# Patient Record
Sex: MALE | Race: WHITE | NOT HISPANIC OR LATINO | Employment: FULL TIME | URBAN - METROPOLITAN AREA
[De-identification: names, ages, dates, MRNs, and addresses within clinical notes are randomized per-mention and may not be internally consistent; named-entity substitution may affect disease eponyms.]

---

## 2017-01-02 ENCOUNTER — APPOINTMENT (EMERGENCY)
Dept: RADIOLOGY | Facility: HOSPITAL | Age: 52
DRG: 227 | End: 2017-01-02
Payer: COMMERCIAL

## 2017-01-02 ENCOUNTER — HOSPITAL ENCOUNTER (INPATIENT)
Facility: HOSPITAL | Age: 52
LOS: 1 days | Discharge: HOME | DRG: 227 | End: 2017-01-04
Attending: EMERGENCY MEDICINE | Admitting: ORTHOPAEDIC SURGERY
Payer: COMMERCIAL

## 2017-01-02 DIAGNOSIS — S42.401A ELBOW FRACTURE, RIGHT: Primary | ICD-10-CM

## 2017-01-02 DIAGNOSIS — T14.8XXA OPEN FRACTURE: ICD-10-CM

## 2017-01-02 LAB
ABO GROUP BLD: NORMAL
ANION GAP SERPL CALCULATED.3IONS-SCNC: 12 MMOL/L (ref 4–13)
APTT PPP: 25 SECONDS (ref 24–36)
BASOPHILS # BLD AUTO: 0 THOUSANDS/ΜL (ref 0–0.1)
BASOPHILS NFR BLD AUTO: 0 % (ref 0–1)
BLD GP AB SCN SERPL QL: NEGATIVE
BUN SERPL-MCNC: 15 MG/DL (ref 5–25)
CALCIUM SERPL-MCNC: 8.9 MG/DL (ref 8.3–10.1)
CHLORIDE SERPL-SCNC: 103 MMOL/L (ref 100–108)
CO2 SERPL-SCNC: 24 MMOL/L (ref 21–32)
CREAT SERPL-MCNC: 1.13 MG/DL (ref 0.6–1.3)
EOSINOPHIL # BLD AUTO: 0.2 THOUSAND/ΜL (ref 0–0.61)
EOSINOPHIL NFR BLD AUTO: 1 % (ref 0–6)
ERYTHROCYTE [DISTWIDTH] IN BLOOD BY AUTOMATED COUNT: 14.5 % (ref 11.6–15.1)
GFR SERPL CREATININE-BSD FRML MDRD: >60 ML/MIN/1.73SQ M
GLUCOSE SERPL-MCNC: 109 MG/DL (ref 65–140)
HCT VFR BLD AUTO: 46.5 % (ref 42–52)
HGB BLD-MCNC: 15.3 G/DL (ref 14–18)
LYMPHOCYTES # BLD AUTO: 3.6 THOUSANDS/ΜL (ref 0.6–4.47)
LYMPHOCYTES NFR BLD AUTO: 32 % (ref 14–44)
MCH RBC QN AUTO: 28.5 PG (ref 27–31)
MCHC RBC AUTO-ENTMCNC: 33 G/DL (ref 31.4–37.4)
MCV RBC AUTO: 86 FL (ref 82–98)
MONOCYTES # BLD AUTO: 1 THOUSAND/ΜL (ref 0.17–1.22)
MONOCYTES NFR BLD AUTO: 9 % (ref 4–12)
NEUTROPHILS # BLD AUTO: 6.4 THOUSANDS/ΜL (ref 1.85–7.62)
NEUTS SEG NFR BLD AUTO: 57 % (ref 43–75)
NRBC BLD AUTO-RTO: 0 /100 WBCS
PLATELET # BLD AUTO: 271 THOUSANDS/UL (ref 130–400)
PMV BLD AUTO: 7.9 FL (ref 8.9–12.7)
POTASSIUM SERPL-SCNC: 4.1 MMOL/L (ref 3.5–5.3)
RBC # BLD AUTO: 5.38 MILLION/UL (ref 4.7–6.1)
RH BLD: NEGATIVE
SODIUM SERPL-SCNC: 139 MMOL/L (ref 136–145)
WBC # BLD AUTO: 11.2 THOUSAND/UL (ref 4.8–10.8)

## 2017-01-02 PROCEDURE — 85730 THROMBOPLASTIN TIME PARTIAL: CPT | Performed by: EMERGENCY MEDICINE

## 2017-01-02 PROCEDURE — 96365 THER/PROPH/DIAG IV INF INIT: CPT

## 2017-01-02 PROCEDURE — 73080 X-RAY EXAM OF ELBOW: CPT

## 2017-01-02 PROCEDURE — 86901 BLOOD TYPING SEROLOGIC RH(D): CPT | Performed by: EMERGENCY MEDICINE

## 2017-01-02 PROCEDURE — 73030 X-RAY EXAM OF SHOULDER: CPT

## 2017-01-02 PROCEDURE — 85025 COMPLETE CBC W/AUTO DIFF WBC: CPT | Performed by: EMERGENCY MEDICINE

## 2017-01-02 PROCEDURE — 73060 X-RAY EXAM OF HUMERUS: CPT

## 2017-01-02 PROCEDURE — 96376 TX/PRO/DX INJ SAME DRUG ADON: CPT

## 2017-01-02 PROCEDURE — 96375 TX/PRO/DX INJ NEW DRUG ADDON: CPT

## 2017-01-02 PROCEDURE — 86850 RBC ANTIBODY SCREEN: CPT | Performed by: EMERGENCY MEDICINE

## 2017-01-02 PROCEDURE — 73090 X-RAY EXAM OF FOREARM: CPT

## 2017-01-02 PROCEDURE — 80048 BASIC METABOLIC PNL TOTAL CA: CPT | Performed by: EMERGENCY MEDICINE

## 2017-01-02 PROCEDURE — 86900 BLOOD TYPING SEROLOGIC ABO: CPT | Performed by: EMERGENCY MEDICINE

## 2017-01-02 PROCEDURE — 36415 COLL VENOUS BLD VENIPUNCTURE: CPT | Performed by: EMERGENCY MEDICINE

## 2017-01-02 RX ORDER — MORPHINE SULFATE 4 MG/ML
4 INJECTION, SOLUTION INTRAMUSCULAR; INTRAVENOUS ONCE
Status: COMPLETED | OUTPATIENT
Start: 2017-01-02 | End: 2017-01-02

## 2017-01-02 RX ORDER — MORPHINE SULFATE 4 MG/ML
INJECTION, SOLUTION INTRAMUSCULAR; INTRAVENOUS
Status: COMPLETED
Start: 2017-01-02 | End: 2017-01-02

## 2017-01-02 RX ADMIN — MORPHINE SULFATE 4 MG: 4 INJECTION, SOLUTION INTRAMUSCULAR; INTRAVENOUS at 20:28

## 2017-01-02 RX ADMIN — MORPHINE SULFATE 4 MG: 4 INJECTION, SOLUTION INTRAMUSCULAR; INTRAVENOUS at 23:09

## 2017-01-02 RX ADMIN — CEFAZOLIN SODIUM 2000 MG: 2 SOLUTION INTRAVENOUS at 23:12

## 2017-01-03 ENCOUNTER — ANESTHESIA EVENT (INPATIENT)
Dept: PERIOP | Facility: HOSPITAL | Age: 52
DRG: 227 | End: 2017-01-03
Payer: COMMERCIAL

## 2017-01-03 ENCOUNTER — ANESTHESIA (INPATIENT)
Dept: PERIOP | Facility: HOSPITAL | Age: 52
DRG: 227 | End: 2017-01-03
Payer: COMMERCIAL

## 2017-01-03 PROBLEM — S42.409B: Status: ACTIVE | Noted: 2017-01-03

## 2017-01-03 PROCEDURE — 0LQ50ZZ REPAIR RIGHT LOWER ARM AND WRIST TENDON, OPEN APPROACH: ICD-10-PCS | Performed by: ORTHOPAEDIC SURGERY

## 2017-01-03 PROCEDURE — 87081 CULTURE SCREEN ONLY: CPT | Performed by: ORTHOPAEDIC SURGERY

## 2017-01-03 PROCEDURE — 99285 EMERGENCY DEPT VISIT HI MDM: CPT

## 2017-01-03 PROCEDURE — 0JDG0ZZ EXTRACTION OF RIGHT LOWER ARM SUBCUTANEOUS TISSUE AND FASCIA, OPEN APPROACH: ICD-10-PCS | Performed by: ORTHOPAEDIC SURGERY

## 2017-01-03 RX ORDER — ONDANSETRON 2 MG/ML
INJECTION INTRAMUSCULAR; INTRAVENOUS AS NEEDED
Status: DISCONTINUED | OUTPATIENT
Start: 2017-01-03 | End: 2017-01-04 | Stop reason: SURG

## 2017-01-03 RX ORDER — PROPOFOL 10 MG/ML
INJECTION, EMULSION INTRAVENOUS AS NEEDED
Status: DISCONTINUED | OUTPATIENT
Start: 2017-01-03 | End: 2017-01-04 | Stop reason: SURG

## 2017-01-03 RX ORDER — KETOROLAC TROMETHAMINE 30 MG/ML
INJECTION, SOLUTION INTRAMUSCULAR; INTRAVENOUS AS NEEDED
Status: DISCONTINUED | OUTPATIENT
Start: 2017-01-03 | End: 2017-01-04 | Stop reason: SURG

## 2017-01-03 RX ORDER — DIPHENHYDRAMINE HCL 25 MG
25 TABLET ORAL EVERY 6 HOURS PRN
Status: DISCONTINUED | OUTPATIENT
Start: 2017-01-03 | End: 2017-01-04 | Stop reason: HOSPADM

## 2017-01-03 RX ORDER — MAGNESIUM HYDROXIDE 1200 MG/15ML
LIQUID ORAL AS NEEDED
Status: DISCONTINUED | OUTPATIENT
Start: 2017-01-03 | End: 2017-01-03 | Stop reason: HOSPADM

## 2017-01-03 RX ORDER — HYDROMORPHONE HCL 110MG/55ML
0.5 PATIENT CONTROLLED ANALGESIA SYRINGE INTRAVENOUS
Status: DISCONTINUED | OUTPATIENT
Start: 2017-01-03 | End: 2017-01-03 | Stop reason: HOSPADM

## 2017-01-03 RX ORDER — MORPHINE SULFATE 2 MG/ML
2 INJECTION, SOLUTION INTRAMUSCULAR; INTRAVENOUS EVERY 4 HOURS PRN
Status: DISCONTINUED | OUTPATIENT
Start: 2017-01-03 | End: 2017-01-04 | Stop reason: HOSPADM

## 2017-01-03 RX ORDER — OXYCODONE HYDROCHLORIDE AND ACETAMINOPHEN 5; 325 MG/1; MG/1
1 TABLET ORAL EVERY 4 HOURS PRN
Status: DISCONTINUED | OUTPATIENT
Start: 2017-01-03 | End: 2017-01-04 | Stop reason: HOSPADM

## 2017-01-03 RX ORDER — MIDAZOLAM HYDROCHLORIDE 1 MG/ML
INJECTION INTRAMUSCULAR; INTRAVENOUS AS NEEDED
Status: DISCONTINUED | OUTPATIENT
Start: 2017-01-03 | End: 2017-01-04 | Stop reason: SURG

## 2017-01-03 RX ORDER — ACETAMINOPHEN 325 MG/1
650 TABLET ORAL EVERY 6 HOURS PRN
Status: DISCONTINUED | OUTPATIENT
Start: 2017-01-03 | End: 2017-01-04 | Stop reason: HOSPADM

## 2017-01-03 RX ORDER — BUPIVACAINE HYDROCHLORIDE AND EPINEPHRINE 5; 5 MG/ML; UG/ML
INJECTION, SOLUTION PERINEURAL AS NEEDED
Status: DISCONTINUED | OUTPATIENT
Start: 2017-01-03 | End: 2017-01-03 | Stop reason: HOSPADM

## 2017-01-03 RX ORDER — ONDANSETRON 4 MG/1
4 TABLET, ORALLY DISINTEGRATING ORAL EVERY 6 HOURS PRN
Status: DISCONTINUED | OUTPATIENT
Start: 2017-01-03 | End: 2017-01-04 | Stop reason: HOSPADM

## 2017-01-03 RX ORDER — DEXTROSE AND SODIUM CHLORIDE 5; .45 G/100ML; G/100ML
100 INJECTION, SOLUTION INTRAVENOUS CONTINUOUS
Status: DISCONTINUED | OUTPATIENT
Start: 2017-01-03 | End: 2017-01-04

## 2017-01-03 RX ORDER — SODIUM CHLORIDE, SODIUM LACTATE, POTASSIUM CHLORIDE, CALCIUM CHLORIDE 600; 310; 30; 20 MG/100ML; MG/100ML; MG/100ML; MG/100ML
INJECTION, SOLUTION INTRAVENOUS CONTINUOUS PRN
Status: DISCONTINUED | OUTPATIENT
Start: 2017-01-03 | End: 2017-01-04 | Stop reason: SURG

## 2017-01-03 RX ORDER — FENTANYL CITRATE 50 UG/ML
INJECTION, SOLUTION INTRAMUSCULAR; INTRAVENOUS AS NEEDED
Status: DISCONTINUED | OUTPATIENT
Start: 2017-01-03 | End: 2017-01-04 | Stop reason: SURG

## 2017-01-03 RX ADMIN — ONDANSETRON 4 MG: 2 INJECTION INTRAMUSCULAR; INTRAVENOUS at 01:30

## 2017-01-03 RX ADMIN — SODIUM CHLORIDE, SODIUM LACTATE, POTASSIUM CHLORIDE, AND CALCIUM CHLORIDE: .6; .31; .03; .02 INJECTION, SOLUTION INTRAVENOUS at 00:28

## 2017-01-03 RX ADMIN — FENTANYL CITRATE 50 MCG: 50 INJECTION, SOLUTION INTRAMUSCULAR; INTRAVENOUS at 01:06

## 2017-01-03 RX ADMIN — FENTANYL CITRATE 50 MCG: 50 INJECTION, SOLUTION INTRAMUSCULAR; INTRAVENOUS at 01:50

## 2017-01-03 RX ADMIN — CEFAZOLIN SODIUM 1000 MG: 1 SOLUTION INTRAVENOUS at 11:00

## 2017-01-03 RX ADMIN — LIDOCAINE HYDROCHLORIDE 100 MG: 20 INJECTION, SOLUTION INTRAVENOUS at 00:42

## 2017-01-03 RX ADMIN — DEXAMETHASONE SODIUM PHOSPHATE 8 MG: 10 INJECTION INTRAMUSCULAR; INTRAVENOUS at 00:46

## 2017-01-03 RX ADMIN — DEXTROSE AND SODIUM CHLORIDE 100 ML/HR: 5; .45 INJECTION, SOLUTION INTRAVENOUS at 03:34

## 2017-01-03 RX ADMIN — CEFAZOLIN SODIUM 1000 MG: 1 SOLUTION INTRAVENOUS at 03:34

## 2017-01-03 RX ADMIN — FENTANYL CITRATE 50 MCG: 50 INJECTION, SOLUTION INTRAMUSCULAR; INTRAVENOUS at 00:40

## 2017-01-03 RX ADMIN — KETOROLAC TROMETHAMINE 30 MG: 30 INJECTION, SOLUTION INTRAMUSCULAR at 01:30

## 2017-01-03 RX ADMIN — FENTANYL CITRATE 50 MCG: 50 INJECTION, SOLUTION INTRAMUSCULAR; INTRAVENOUS at 01:03

## 2017-01-03 RX ADMIN — PROPOFOL 200 MG: 10 INJECTION, EMULSION INTRAVENOUS at 00:42

## 2017-01-03 RX ADMIN — MIDAZOLAM HYDROCHLORIDE 2 MG: 1 INJECTION, SOLUTION INTRAMUSCULAR; INTRAVENOUS at 00:35

## 2017-01-03 RX ADMIN — CEFAZOLIN SODIUM 1000 MG: 1 SOLUTION INTRAVENOUS at 18:25

## 2017-01-04 VITALS
TEMPERATURE: 98.6 F | RESPIRATION RATE: 18 BRPM | OXYGEN SATURATION: 95 % | SYSTOLIC BLOOD PRESSURE: 117 MMHG | DIASTOLIC BLOOD PRESSURE: 71 MMHG | HEART RATE: 68 BPM | HEIGHT: 70 IN | WEIGHT: 234 LBS

## 2017-01-04 LAB
BASOPHILS # BLD AUTO: 0 THOUSANDS/ΜL (ref 0–0.1)
BASOPHILS NFR BLD AUTO: 0 % (ref 0–1)
EOSINOPHIL # BLD AUTO: 0.1 THOUSAND/ΜL (ref 0–0.61)
EOSINOPHIL NFR BLD AUTO: 0 % (ref 0–6)
ERYTHROCYTE [DISTWIDTH] IN BLOOD BY AUTOMATED COUNT: 14.5 % (ref 11.6–15.1)
HCT VFR BLD AUTO: 41.3 % (ref 42–52)
HGB BLD-MCNC: 14.1 G/DL (ref 14–18)
LYMPHOCYTES # BLD AUTO: 2.1 THOUSANDS/ΜL (ref 0.6–4.47)
LYMPHOCYTES NFR BLD AUTO: 15 % (ref 14–44)
MCH RBC QN AUTO: 29.3 PG (ref 27–31)
MCHC RBC AUTO-ENTMCNC: 34.2 G/DL (ref 31.4–37.4)
MCV RBC AUTO: 86 FL (ref 82–98)
MONOCYTES # BLD AUTO: 1 THOUSAND/ΜL (ref 0.17–1.22)
MONOCYTES NFR BLD AUTO: 7 % (ref 4–12)
MRSA NOSE QL CULT: NORMAL
NEUTROPHILS # BLD AUTO: 11 THOUSANDS/ΜL (ref 1.85–7.62)
NEUTS SEG NFR BLD AUTO: 77 % (ref 43–75)
NRBC BLD AUTO-RTO: 0 /100 WBCS
PLATELET # BLD AUTO: 209 THOUSANDS/UL (ref 130–400)
PMV BLD AUTO: 7.7 FL (ref 8.9–12.7)
RBC # BLD AUTO: 4.81 MILLION/UL (ref 4.7–6.1)
WBC # BLD AUTO: 14.2 THOUSAND/UL (ref 4.8–10.8)

## 2017-01-04 PROCEDURE — 94760 N-INVAS EAR/PLS OXIMETRY 1: CPT

## 2017-01-04 PROCEDURE — 85025 COMPLETE CBC W/AUTO DIFF WBC: CPT | Performed by: PHYSICIAN ASSISTANT

## 2017-01-04 RX ORDER — OXYCODONE HYDROCHLORIDE AND ACETAMINOPHEN 5; 325 MG/1; MG/1
1 TABLET ORAL EVERY 6 HOURS PRN
Qty: 30 TABLET | Refills: 0 | Status: SHIPPED | OUTPATIENT
Start: 2017-01-04 | End: 2017-01-14

## 2017-01-04 RX ORDER — CEPHALEXIN 500 MG/1
500 CAPSULE ORAL 4 TIMES DAILY
Qty: 28 CAPSULE | Refills: 0 | Status: SHIPPED | OUTPATIENT
Start: 2017-01-04 | End: 2017-01-11

## 2017-01-04 RX ADMIN — CEFAZOLIN SODIUM 1000 MG: 1 SOLUTION INTRAVENOUS at 03:35

## 2017-01-06 ENCOUNTER — TELEPHONE (OUTPATIENT)
Dept: INPATIENT UNIT | Facility: HOSPITAL | Age: 52
End: 2017-01-06

## 2017-01-13 ENCOUNTER — ALLSCRIPTS OFFICE VISIT (OUTPATIENT)
Dept: OTHER | Facility: OTHER | Age: 52
End: 2017-01-13

## 2017-01-13 ENCOUNTER — HOSPITAL ENCOUNTER (OUTPATIENT)
Dept: RADIOLOGY | Facility: CLINIC | Age: 52
Discharge: HOME | End: 2017-01-13
Payer: COMMERCIAL

## 2017-01-13 DIAGNOSIS — S42.401A CLOSED FRACTURE OF LOWER END OF RIGHT HUMERUS: ICD-10-CM

## 2017-01-13 PROCEDURE — 73070 X-RAY EXAM OF ELBOW: CPT

## 2017-01-17 ENCOUNTER — APPOINTMENT (OUTPATIENT)
Dept: OCCUPATIONAL THERAPY | Facility: CLINIC | Age: 52
End: 2017-01-17
Payer: COMMERCIAL

## 2017-01-17 DIAGNOSIS — S42.401A CLOSED FRACTURE OF LOWER END OF RIGHT HUMERUS: ICD-10-CM

## 2017-01-17 PROCEDURE — 97165 OT EVAL LOW COMPLEX 30 MIN: CPT

## 2017-01-20 ENCOUNTER — ALLSCRIPTS OFFICE VISIT (OUTPATIENT)
Dept: OTHER | Facility: OTHER | Age: 52
End: 2017-01-20

## 2017-01-25 ENCOUNTER — APPOINTMENT (OUTPATIENT)
Dept: OCCUPATIONAL THERAPY | Facility: CLINIC | Age: 52
End: 2017-01-25
Payer: COMMERCIAL

## 2017-02-01 ENCOUNTER — APPOINTMENT (OUTPATIENT)
Dept: OCCUPATIONAL THERAPY | Facility: CLINIC | Age: 52
End: 2017-02-01
Payer: COMMERCIAL

## 2017-02-08 ENCOUNTER — APPOINTMENT (OUTPATIENT)
Dept: OCCUPATIONAL THERAPY | Facility: CLINIC | Age: 52
End: 2017-02-08
Payer: COMMERCIAL

## 2017-02-08 PROCEDURE — 97110 THERAPEUTIC EXERCISES: CPT

## 2017-02-15 ENCOUNTER — APPOINTMENT (OUTPATIENT)
Dept: OCCUPATIONAL THERAPY | Facility: CLINIC | Age: 52
End: 2017-02-15
Payer: COMMERCIAL

## 2017-02-15 PROCEDURE — 97110 THERAPEUTIC EXERCISES: CPT

## 2017-02-22 ENCOUNTER — APPOINTMENT (OUTPATIENT)
Dept: OCCUPATIONAL THERAPY | Facility: CLINIC | Age: 52
End: 2017-02-22
Payer: COMMERCIAL

## 2017-02-22 ENCOUNTER — ALLSCRIPTS OFFICE VISIT (OUTPATIENT)
Dept: OTHER | Facility: OTHER | Age: 52
End: 2017-02-22

## 2017-02-22 PROCEDURE — 97110 THERAPEUTIC EXERCISES: CPT

## 2017-07-05 ENCOUNTER — ALLSCRIPTS OFFICE VISIT (OUTPATIENT)
Dept: OTHER | Facility: OTHER | Age: 52
End: 2017-07-05

## 2017-07-05 ENCOUNTER — APPOINTMENT (OUTPATIENT)
Dept: RADIOLOGY | Facility: CLINIC | Age: 52
End: 2017-07-05
Payer: COMMERCIAL

## 2017-07-05 DIAGNOSIS — M25.522 PAIN IN LEFT ELBOW: ICD-10-CM

## 2017-07-05 DIAGNOSIS — M77.12 LATERAL EPICONDYLITIS OF LEFT ELBOW: ICD-10-CM

## 2017-07-05 PROCEDURE — 73070 X-RAY EXAM OF ELBOW: CPT

## 2018-01-12 VITALS — WEIGHT: 228 LBS | BODY MASS INDEX: 31.92 KG/M2 | HEIGHT: 71 IN

## 2020-10-12 ENCOUNTER — HOSPITAL ENCOUNTER (EMERGENCY)
Facility: HOSPITAL | Age: 55
Discharge: HOME | End: 2020-10-12
Attending: EMERGENCY MEDICINE
Payer: COMMERCIAL

## 2020-10-12 ENCOUNTER — APPOINTMENT (EMERGENCY)
Dept: RADIOLOGY | Facility: HOSPITAL | Age: 55
End: 2020-10-12
Payer: COMMERCIAL

## 2020-10-12 VITALS
OXYGEN SATURATION: 99 % | BODY MASS INDEX: 32.64 KG/M2 | DIASTOLIC BLOOD PRESSURE: 95 MMHG | WEIGHT: 234 LBS | SYSTOLIC BLOOD PRESSURE: 159 MMHG | RESPIRATION RATE: 16 BRPM | TEMPERATURE: 97.2 F | HEART RATE: 60 BPM

## 2020-10-12 DIAGNOSIS — S93.609A FOOT SPRAIN: Primary | ICD-10-CM

## 2020-10-12 PROCEDURE — 73630 X-RAY EXAM OF FOOT: CPT

## 2020-10-12 PROCEDURE — 73600 X-RAY EXAM OF ANKLE: CPT

## 2020-10-12 PROCEDURE — 99283 EMERGENCY DEPT VISIT LOW MDM: CPT

## 2020-10-12 PROCEDURE — 99284 EMERGENCY DEPT VISIT MOD MDM: CPT | Performed by: PHYSICIAN ASSISTANT

## 2020-10-22 ENCOUNTER — OFFICE VISIT (OUTPATIENT)
Dept: PODIATRY | Facility: CLINIC | Age: 55
End: 2020-10-22
Payer: COMMERCIAL

## 2020-10-22 VITALS
SYSTOLIC BLOOD PRESSURE: 145 MMHG | HEART RATE: 63 BPM | BODY MASS INDEX: 32.76 KG/M2 | WEIGHT: 234 LBS | DIASTOLIC BLOOD PRESSURE: 95 MMHG | HEIGHT: 71 IN | RESPIRATION RATE: 16 BRPM

## 2020-10-22 DIAGNOSIS — M79.671 RIGHT FOOT PAIN: ICD-10-CM

## 2020-10-22 DIAGNOSIS — S93.529A TURF TOE, INITIAL ENCOUNTER: Primary | ICD-10-CM

## 2020-10-22 PROCEDURE — 99203 OFFICE O/P NEW LOW 30 MIN: CPT | Performed by: PODIATRIST

## 2020-10-22 RX ORDER — MELOXICAM 7.5 MG/1
7.5 TABLET ORAL DAILY
Qty: 30 TABLET | Refills: 0 | Status: SHIPPED | OUTPATIENT
Start: 2020-10-22

## 2021-06-15 ENCOUNTER — IMMUNIZATIONS (OUTPATIENT)
Dept: FAMILY MEDICINE CLINIC | Facility: HOSPITAL | Age: 56
End: 2021-06-15

## 2021-06-15 DIAGNOSIS — Z23 ENCOUNTER FOR IMMUNIZATION: Primary | ICD-10-CM

## 2021-06-15 PROCEDURE — 0011A SARS-COV-2 / COVID-19 MRNA VACCINE (MODERNA) 100 MCG: CPT

## 2021-06-15 PROCEDURE — 91301 SARS-COV-2 / COVID-19 MRNA VACCINE (MODERNA) 100 MCG: CPT

## 2021-07-13 ENCOUNTER — IMMUNIZATIONS (OUTPATIENT)
Dept: FAMILY MEDICINE CLINIC | Facility: HOSPITAL | Age: 56
End: 2021-07-13

## 2021-07-13 DIAGNOSIS — Z23 ENCOUNTER FOR IMMUNIZATION: Primary | ICD-10-CM

## 2021-07-13 PROCEDURE — 91301 SARS-COV-2 / COVID-19 MRNA VACCINE (MODERNA) 100 MCG: CPT

## 2021-07-13 PROCEDURE — 0012A SARS-COV-2 / COVID-19 MRNA VACCINE (MODERNA) 100 MCG: CPT

## 2023-02-08 VITALS
DIASTOLIC BLOOD PRESSURE: 79 MMHG | HEIGHT: 71 IN | BODY MASS INDEX: 31.92 KG/M2 | HEART RATE: 58 BPM | SYSTOLIC BLOOD PRESSURE: 121 MMHG | WEIGHT: 228 LBS

## 2023-02-08 DIAGNOSIS — Z87.828: ICD-10-CM

## 2023-02-08 DIAGNOSIS — Z98.890 HX OF ELBOW SURGERY: Primary | ICD-10-CM

## 2023-02-08 NOTE — PROGRESS NOTES
Assessment/Plan:  1  Hx of elbow surgery        2  Hx of dislocation of elbow          Scribe Attestation    I,:  Amanda Moore am acting as a scribe while in the presence of the attending physician :       I,:  Johnie Medina MD personally performed the services described in this documentation    as scribed in my presence :         Aditi Wooten upon exam today demonstrates significant weakness with pronation of the right forearm and a mildly positive milking maneuver although he has gross stability to valgus stress  I was unable to palpable fascial defect in the flexor pronator muscle bellies and the tendon of the flexor pronator mass is intact  He does have full strength in the first dorsal interosseous and baseline sensation is intact in the ulnar nerve distribution  His signs and symptoms lead me to believe there was a tear of the pronator teres muscle that simply did not fully heal after his injury  This is evident with his weakness and mild pain with pronation  He is able to pronate with more strength with the elbow in flexion as the pronator quadratus assists in this plane  I do not recommend another surgery for this at this time  I do recommend activity modification by performing pronation with a flexed elbow instead of a straight elbow  Otherwise, I have no restrictions for him at this point  He should continue his activities as tolerated  He may follow up as needed  Subjective:   Jada Chen is a 62 y o  male who presents evaluation of the right elbow and forearm  I performed surgery on his elbow which involved a repair of the UCL and the flexor pronator mass after an elbow dislocation back in 2017  The patient states he has had weakness in the forearm that started about 6 months after surgery  He did physical therapy after the surgery but did not feel improvement in strength  He reports he feels shaky especially with pronation   He states when performing lifting or resisted maneuvers, he feels like his anterior forearm muscles are splitting  Review of Systems   Constitutional: Negative for chills, fever and unexpected weight change  HENT: Negative for nosebleeds and sore throat  Eyes: Negative for pain, redness and visual disturbance  Respiratory: Negative for cough, shortness of breath and wheezing  Cardiovascular: Negative for chest pain, palpitations and leg swelling  Gastrointestinal: Negative for nausea and vomiting  Endocrine: Negative for polydipsia and polyuria  Genitourinary: Negative for dysuria and hematuria  Musculoskeletal: Positive for myalgias  Negative for arthralgias and joint swelling  See HPI   Skin: Negative for rash and wound  Neurological: Negative for dizziness, numbness and headaches  Psychiatric/Behavioral: Negative for decreased concentration  The patient is not nervous/anxious  History reviewed  No pertinent past medical history      Past Surgical History:   Procedure Laterality Date   • HERNIA REPAIR     • WOUND DEBRIDEMENT Right 1/3/2017    Procedure: DEBRIDEMENT UPPER EXTREMITY ([address]) and Repair Flexor Tendon Elbow, Repair Ulnar Collateral Ligaments;  Surgeon: Devin Taylor MD;  Location: [facility];  Service:        Family History   Problem Relation Age of Onset   • No Known Problems Mother    • No Known Problems Father        Social History     Occupational History   • Not on file   Tobacco Use   • Smoking status: Never   • Smokeless tobacco: Never   Vaping Use   • Vaping Use: Never used   Substance and Sexual Activity   • Alcohol use: Never   • Drug use: No   • Sexual activity: Not on file         Current Outpatient Medications:   •  meloxicam (MOBIC) 7 5 mg tablet, Take 1 tablet (7 5 mg total) by mouth daily, Disp: 30 tablet, Rfl: 0    Allergies   Allergen Reactions   • Prochlorperazine Other (See Comments)     Unknown (compazine)       Objective:  Vitals:    02/08/23 1248   BP: 121/79   Pulse: 58       Right Elbow Exam Range of Motion   Extension: 0   Flexion: 120     Muscle Strength   Pronation:  3/5   Supination:  4/5     Tests   Valgus: negative    Other   Scars: present (prior surgical incision, well healed)  Sensation: normal  Pulse: present    Comments:  Milking maneuver of elbow: positive  nontender at sublime tubercle  No obvious palpable fascial defect in the flexor pronator muscle belly  Tendon is intact  First dorsal interosseous 5/5  Ulnar nerve baseline sensation is intact  Left Elbow Exam     Muscle Strength   Pronation:  4/5   Supination:  4/5     Other   Left elbow scars: prior surgical incision, well healed  Physical Exam  HENT:      Head: Normocephalic and atraumatic  Eyes:      General:         Right eye: No discharge  Left eye: No discharge  Conjunctiva/sclera: Conjunctivae normal    Cardiovascular:      Rate and Rhythm: Normal rate  Pulmonary:      Effort: Pulmonary effort is normal  No respiratory distress  Musculoskeletal:         General: Tenderness present  No swelling  Normal range of motion  Cervical back: Normal range of motion and neck supple  Comments: See Ortho   Skin:     General: Skin is warm and dry  Neurological:      Mental Status: He is alert and oriented to person, place, and time  I have personally reviewed pertinent films in PACS and my interpretation is as follows: No imaging reviewed today  This document was created using speech voice recognition software  Grammatical errors, random word insertions, pronoun errors, and incomplete sentences are an occasional consequence of this system due to software limitations, ambient noise, and hardware issues  Any formal questions or concerns about content, text, or information contained within the body of this dictation should be directly addressed to the provider for clarification

## 2023-06-21 ENCOUNTER — OFFICE VISIT (OUTPATIENT)
Dept: FAMILY MEDICINE CLINIC | Facility: CLINIC | Age: 58
End: 2023-06-21
Payer: COMMERCIAL

## 2023-06-21 VITALS
DIASTOLIC BLOOD PRESSURE: 72 MMHG | OXYGEN SATURATION: 96 % | SYSTOLIC BLOOD PRESSURE: 124 MMHG | HEIGHT: 71 IN | HEART RATE: 78 BPM | BODY MASS INDEX: 33.33 KG/M2 | WEIGHT: 238.1 LBS | RESPIRATION RATE: 18 BRPM | TEMPERATURE: 97.6 F

## 2023-06-21 DIAGNOSIS — Z11.59 NEED FOR HEPATITIS C SCREENING TEST: ICD-10-CM

## 2023-06-21 DIAGNOSIS — Z12.11 SCREENING FOR COLON CANCER: ICD-10-CM

## 2023-06-21 DIAGNOSIS — Z11.4 SCREENING FOR HIV (HUMAN IMMUNODEFICIENCY VIRUS): ICD-10-CM

## 2023-06-21 DIAGNOSIS — Z00.00 ANNUAL PHYSICAL EXAM: Primary | ICD-10-CM

## 2023-06-21 PROBLEM — S42.409B: Status: RESOLVED | Noted: 2017-01-03 | Resolved: 2023-06-21

## 2023-06-21 PROCEDURE — 99386 PREV VISIT NEW AGE 40-64: CPT | Performed by: FAMILY MEDICINE

## 2023-06-21 NOTE — PROGRESS NOTES
"Assessment/Plan:  Diagnoses and all orders for this visit:    Annual physical exam  -     Lipid Panel with Direct LDL reflex; Future  -     CBC and differential; Future  -     Comprehensive metabolic panel; Future  -     Lipid Panel with Direct LDL reflex  -     CBC and differential  -     Comprehensive metabolic panel    Need for hepatitis C screening test  -     Hepatitis C Antibody; Future  -     Hepatitis C Antibody    Screening for HIV (human immunodeficiency virus)  -     HIV 1/2 AG/AB w Reflex [facility] for 2 yr old and above; Future    Screening for colon cancer  -     Cologuard          Return in about 1 year (around 6/21/2024) for Annual     Subjective:   59-year-old male who presented for an annual examination  The following portions of the patient's history were reviewed and updated as appropriate: allergies, current medications, past family history, past medical history, past social history, past surgical history and problem list     Review of Systems   All other systems reviewed and are negative  Objective:  /72   Pulse 78   Temp 97 6 °F (36 4 °C)   Resp 18   Ht 5' 11\" (1 803 m)   Wt 108 kg (238 lb 1 6 oz)   SpO2 96%   BMI 33 21 kg/m²     Physical Exam  Vitals reviewed  Constitutional:       General: He is not in acute distress  Appearance: Normal appearance  He is not ill-appearing, toxic-appearing or diaphoretic  HENT:      Head: Normocephalic and atraumatic  Nose: Nose normal       Mouth/Throat:      Mouth: Mucous membranes are moist    Eyes:      Pupils: Pupils are equal, round, and reactive to light  Cardiovascular:      Rate and Rhythm: Normal rate and regular rhythm  Pulses: Normal pulses  Heart sounds: Normal heart sounds  No murmur heard  No gallop  Pulmonary:      Effort: Pulmonary effort is normal       Breath sounds: Normal breath sounds  No wheezing or rhonchi  Abdominal:      General: Abdomen is flat   Bowel sounds are normal  There is no " distension  Palpations: Abdomen is soft  Musculoskeletal:         General: Normal range of motion  Cervical back: Normal range of motion  Lymphadenopathy:      Cervical: No cervical adenopathy  Neurological:      General: No focal deficit present  Mental Status: He is alert and oriented to person, place, and time  Motor: No weakness        Coordination: Coordination normal        Romario Sanchez MD  06/21/23  11:44 AM

## 2023-07-13 LAB — COLOGUARD RESULT REPORTABLE: NEGATIVE

## 2023-07-20 LAB
ALBUMIN SERPL-MCNC: 4.7 G/DL (ref 3.8–4.9)
ALBUMIN/GLOB SERPL: 2 {RATIO} (ref 1.2–2.2)
ALP SERPL-CCNC: 66 IU/L (ref 44–121)
ALT SERPL-CCNC: 56 IU/L (ref 0–44)
AST SERPL-CCNC: 38 IU/L (ref 0–40)
BASOPHILS # BLD AUTO: 0 X10E3/UL (ref 0–0.2)
BASOPHILS NFR BLD AUTO: 0 %
BILIRUB SERPL-MCNC: 0.6 MG/DL (ref 0–1.2)
BUN SERPL-MCNC: 16 MG/DL (ref 6–24)
BUN/CREAT SERPL: 14 (ref 9–20)
CALCIUM SERPL-MCNC: 9.7 MG/DL (ref 8.7–10.2)
CHLORIDE SERPL-SCNC: 104 MMOL/L (ref 96–106)
CHOLEST SERPL-MCNC: 229 MG/DL (ref 100–199)
CO2 SERPL-SCNC: 22 MMOL/L (ref 20–29)
CREAT SERPL-MCNC: 1.11 MG/DL (ref 0.76–1.27)
EGFR: 77 ML/MIN/1.73
EOSINOPHIL # BLD AUTO: 0.1 X10E3/UL (ref 0–0.4)
EOSINOPHIL NFR BLD AUTO: 2 %
ERYTHROCYTE [DISTWIDTH] IN BLOOD BY AUTOMATED COUNT: 13.8 % (ref 11.6–15.4)
GLOBULIN SER-MCNC: 2.4 G/DL (ref 1.5–4.5)
GLUCOSE SERPL-MCNC: 110 MG/DL (ref 70–99)
HCT VFR BLD AUTO: 46.4 % (ref 37.5–51)
HCV AB S/CO SERPL IA: NON REACTIVE
HDLC SERPL-MCNC: 37 MG/DL
HGB BLD-MCNC: 15.4 G/DL (ref 13–17.7)
HIV 1+2 AB+HIV1 P24 AG SERPL QL IA: NON REACTIVE
IMM GRANULOCYTES # BLD: 0 X10E3/UL (ref 0–0.1)
IMM GRANULOCYTES NFR BLD: 1 %
LDLC SERPL CALC-MCNC: 166 MG/DL (ref 0–99)
LDLC/HDLC SERPL: 4.5 RATIO (ref 0–3.6)
LYMPHOCYTES # BLD AUTO: 2.2 X10E3/UL (ref 0.7–3.1)
LYMPHOCYTES NFR BLD AUTO: 28 %
MCH RBC QN AUTO: 28.7 PG (ref 26.6–33)
MCHC RBC AUTO-ENTMCNC: 33.2 G/DL (ref 31.5–35.7)
MCV RBC AUTO: 86 FL (ref 79–97)
MONOCYTES # BLD AUTO: 0.8 X10E3/UL (ref 0.1–0.9)
MONOCYTES NFR BLD AUTO: 11 %
NEUTROPHILS # BLD AUTO: 4.6 X10E3/UL (ref 1.4–7)
NEUTROPHILS NFR BLD AUTO: 58 %
PLATELET # BLD AUTO: 260 X10E3/UL (ref 150–450)
POTASSIUM SERPL-SCNC: 4.7 MMOL/L (ref 3.5–5.2)
PROT SERPL-MCNC: 7.1 G/DL (ref 6–8.5)
RBC # BLD AUTO: 5.37 X10E6/UL (ref 4.14–5.8)
SL AMB VLDL CHOLESTEROL CALC: 26 MG/DL (ref 5–40)
SODIUM SERPL-SCNC: 139 MMOL/L (ref 134–144)
TRIGL SERPL-MCNC: 141 MG/DL (ref 0–149)
WBC # BLD AUTO: 7.8 X10E3/UL (ref 3.4–10.8)

## 2023-08-03 ENCOUNTER — TELEPHONE (OUTPATIENT)
Dept: FAMILY MEDICINE CLINIC | Facility: CLINIC | Age: 58
End: 2023-08-03

## 2023-08-03 NOTE — TELEPHONE ENCOUNTER
Patient is in office wanting to see if the doctor can call about lab results. Please call [phone].

## 2023-08-07 NOTE — TELEPHONE ENCOUNTER
Called patient and left VM advising that Dr. Dorinda Kirk is requesting he come in office to see any available provider to go over lab results.

## 2023-08-08 ENCOUNTER — OFFICE VISIT (OUTPATIENT)
Dept: FAMILY MEDICINE CLINIC | Facility: CLINIC | Age: 58
End: 2023-08-08
Payer: COMMERCIAL

## 2023-08-08 VITALS
HEART RATE: 63 BPM | BODY MASS INDEX: 32.76 KG/M2 | SYSTOLIC BLOOD PRESSURE: 133 MMHG | DIASTOLIC BLOOD PRESSURE: 78 MMHG | TEMPERATURE: 97.8 F | WEIGHT: 234 LBS | HEIGHT: 71 IN | RESPIRATION RATE: 16 BRPM | OXYGEN SATURATION: 96 %

## 2023-08-08 DIAGNOSIS — R73.9 HYPERGLYCEMIA: ICD-10-CM

## 2023-08-08 DIAGNOSIS — E78.2 MIXED HYPERLIPIDEMIA: Primary | ICD-10-CM

## 2023-08-08 DIAGNOSIS — Z80.42 FAMILY HX OF PROSTATE CANCER: ICD-10-CM

## 2023-08-08 PROCEDURE — 99214 OFFICE O/P EST MOD 30 MIN: CPT | Performed by: FAMILY MEDICINE

## 2023-08-08 NOTE — ASSESSMENT & PLAN NOTE
Elevated total cholesterol, low HDL and elevated LDL in the 160s  ASCVD risk of 10.4%. Patient has been making dietary modifications and states that he has decreased his total cholesterol from the 260s to the 240s however currently unavailable. Discussed options such as continued diet, exercise and weight loss as well as medications. Patient was on statin in the past however it caused muscle aches therefore was stopped immediately.  -Continue off of medication with goal of losing 20 pounds.   Repeat lipid panel 6 months to see progress

## 2023-08-08 NOTE — PROGRESS NOTES
Name: Emmanuel Casillas      : 1965      MRN: [id number]  Encounter Provider: Tanika Mike MD  Encounter Date: 2023   Encounter department: [address]     1. Mixed hyperlipidemia  Assessment & Plan:  Elevated total cholesterol, low HDL and elevated LDL in the 160s  ASCVD risk of 10.4%. Patient has been making dietary modifications and states that he has decreased his total cholesterol from the 260s to the 240s however currently unavailable. Discussed options such as continued diet, exercise and weight loss as well as medications. Patient was on statin in the past however it caused muscle aches therefore was stopped immediately.  -Continue off of medication with goal of losing 20 pounds. Repeat lipid panel 6 months to see progress    Orders:  -     Lipid Panel with Direct LDL reflex; Future  -     Comprehensive metabolic panel; Future  -     Lipid Panel with Direct LDL reflex  -     Comprehensive metabolic panel    2. Hyperglycemia  Comments:  Fasting blood glucose of 110 noted at recent blood work. -Repeat hemoglobin A1c  Orders:  -     HEMOGLOBIN A1C W/ EAG ESTIMATION; Future  -     Comprehensive metabolic panel; Future  -     HEMOGLOBIN A1C W/ EAG ESTIMATION  -     Comprehensive metabolic panel    3. Family hx of prostate cancer  Comments:  Family history of prostate cancer in father;  Patient has routine PSAs performed every 1 to 2 years. -Repeat PSA and follow-up with urology  Orders:  -     PSA, Total Screen; Future         Subjective      Patient is a very pleasant 27-year-old male who presents to the clinic for follow-up on lab results. He does have a history of hyper lipidemia for which she has been doing diet and exercise treatment. He has tried statins in the past however they led to also aches therefore was stopped. Review of Systems   Constitutional: Negative for fatigue. HENT: Negative for congestion, rhinorrhea and sore throat.     Eyes: Negative for visual disturbance. Respiratory: Negative for cough, shortness of breath and wheezing. Cardiovascular: Negative for chest pain. Gastrointestinal: Negative for abdominal distention, abdominal pain, blood in stool, constipation, diarrhea, nausea and vomiting. Genitourinary: Negative for difficulty urinating and dysuria. Musculoskeletal: Negative for arthralgias. Skin: Negative for rash. Neurological: Negative for dizziness and headaches. No current outpatient medications on file prior to visit. Objective     /78 (BP Location: Left arm, Patient Position: Sitting, Cuff Size: Large)   Pulse 63   Temp 97.8 °F (36.6 °C) (Temporal)   Resp 16   Ht 5' 11" (1.803 m)   Wt 106 kg (234 lb)   SpO2 96%   BMI 32.64 kg/m²     Physical Exam  Constitutional:       General: He is not in acute distress. Appearance: Normal appearance. He is well-developed. He is obese. He is not ill-appearing, toxic-appearing or diaphoretic. HENT:      Head: Normocephalic and atraumatic. Right Ear: External ear normal.      Left Ear: External ear normal.      Nose: Nose normal.      Mouth/Throat:      Mouth: Mucous membranes are moist.      Pharynx: No oropharyngeal exudate or posterior oropharyngeal erythema. Eyes:      General: No scleral icterus. Right eye: No discharge. Left eye: No discharge. Extraocular Movements: Extraocular movements intact. Conjunctiva/sclera: Conjunctivae normal.   Cardiovascular:      Rate and Rhythm: Normal rate and regular rhythm. Pulses: Normal pulses. Heart sounds: Normal heart sounds. No murmur heard. Pulmonary:      Effort: Pulmonary effort is normal. No respiratory distress. Breath sounds: Normal breath sounds. No wheezing. Abdominal:      General: Abdomen is flat. Bowel sounds are normal. There is no distension. Palpations: Abdomen is soft. Tenderness: There is no abdominal tenderness. Musculoskeletal:         General: No swelling or tenderness. Normal range of motion. Cervical back: Normal range of motion and neck supple. No rigidity. No muscular tenderness. Lymphadenopathy:      Cervical: No cervical adenopathy. Skin:     General: Skin is warm and dry. Coloration: Skin is not jaundiced. Neurological:      General: No focal deficit present. Mental Status: He is alert and oriented to person, place, and time. Cranial Nerves: No cranial nerve deficit. Motor: No weakness.    Psychiatric:         Mood and Affect: Mood normal.       Sarah Jerez MD

## 2023-08-08 NOTE — PATIENT INSTRUCTIONS
Heart Healthy Diet   WHAT YOU NEED TO KNOW:   A heart healthy diet is an eating plan low in unhealthy fats and sodium (salt). The plan is high in healthy fats and fiber. A heart healthy diet helps improve your cholesterol levels and lowers your risk for heart disease and stroke. A dietitian will teach you how to read and understand food labels. DISCHARGE INSTRUCTIONS:   Heart healthy diet guidelines to follow:   Choose foods that contain healthy fats:      Unsaturated fats  include monounsaturated and polyunsaturated fats. Unsaturated fat is found in foods such as soybean, canola, olive, corn, and safflower oils. It is also found in soft tub margarine that is made with liquid vegetable oil. Omega-3 fat  is found in certain fish, such as salmon, tuna, and trout, and in walnuts and flaxseed. Eat fish high in omega-3 fats at least 2 times a week. Limit or do not have unhealthy fats:      Cholesterol  is found in animal foods, such as eggs and lobster, and in dairy products made from whole milk. Limit cholesterol to less than 200 mg each day. Saturated fat  is found in meats, such as amezcua and hamburger. It is also found in chicken or turkey skin, whole milk, and butter. Limit saturated fat to less than 7% of your total daily calories. Trans fat  is found in packaged foods, such as potato chips and cookies. It is also in hard margarine, some fried foods, and shortening. Do not eat foods that contain trans fats. Get 20 to 30 grams of fiber each day. Fruits, vegetables, whole-grain foods, and legumes (cooked beans) are good sources of fiber. Limit sodium as directed. You may be told to limit sodium, such as to 2,000 mg or less each day. Choose low-sodium or no-salt-added foods. Add little or no salt to food you prepare. Use herbs and spices in place of salt.        Include the following in your heart healthy plan:  Ask your dietitian or healthcare provider how many servings to have each day from the following food groups:  Grains:      Whole-wheat breads, cereals, and pastas, and brown rice    Low-fat, low-sodium crackers and chips    Vegetables:      Broccoli, green beans, green peas, and spinach    Collards, kale, and lima beans    Carrots, sweet potatoes, tomatoes, and peppers    Canned vegetables with no salt added    Fruits:      Bananas, peaches, pears, and pineapple    Grapes, raisins, and dates    Oranges, tangerines, grapefruit, orange juice, and grapefruit juice    Apricots, mangoes, melons, and papaya    Raspberries and strawberries    Canned fruit with no added sugar    Low-fat dairy:      Nonfat (skim) milk, 1% milk, and low-fat almond, cashew, or soy milks fortified with calcium    Low-fat cheese, regular or frozen yogurt, and cottage cheese    Meats and proteins:      Lean cuts of beef and pork (loin, leg, round), skinless chicken and turkey    Legumes, soy products, egg whites, or nuts    Limit or do not include the following in your heart healthy plan:   Foods and liquids that contain unhealthy fats and oils:      Whole or 2% milk, cream cheese, sour cream, or cheese    High-fat cuts of beef (T-bone steaks, ribs), chicken or turkey with skin, and organ meats such as liver    Butter, stick margarine, shortening, and cooking oils such as coconut or palm oil    Foods and liquids high in sodium:      Packaged foods, such as frozen dinners, cookies, macaroni and cheese, and cereals with more than 300 mg of sodium per serving    Vegetables with added sodium, such as instant potatoes, vegetables with added sauces, or regular canned vegetables    Cured or smoked meats, such as hot dogs, amezcua, and sausage    High-sodium ketchup, barbecue sauce, salad dressing, pickles, olives, soy sauce, or miso    Foods and liquids high in sugar:      Candy, cake, cookies, pies, or doughnuts    Soft drinks (soda), sports drinks, or sweetened tea    Canned or dry mixes for cakes, soups, sauces, or gravies    Other healthy heart guidelines:   Do not smoke. Nicotine and other chemicals in cigarettes and cigars can cause lung and heart damage. Ask your healthcare provider for information if you currently smoke and need help to quit. E-cigarettes or smokeless tobacco still contain nicotine. Talk to your provider before you use these products. Limit or do not drink alcohol as directed. Alcohol can damage your heart and raise your blood pressure. Your provider may give you specific daily and weekly limits. The general recommended limit is 1 drink a day for women 21 or older and for men 72 or older. Do not have more than 3 drinks within 24 hours or 7 within a week. The recommended limit is 2 drinks a day for men 24to 59years of age. Do not have more than 4 drinks within 24 hours or 14 within a week. A drink of alcohol is 12 ounces of beer, 5 ounces of wine, or 1½ ounces of liquor. Maintain a healthy weight. Extra body weight makes your heart work harder. Ask your provider what a healthy weight is for you. He or she can help you create a safe weight loss plan, if needed. Exercise regularly. Exercise can help you maintain a healthy weight and improve your blood pressure and cholesterol levels. Regular exercise can also decrease your risk for heart problems. Ask your provider about the best exercise plan for you. Do not start an exercise program without asking your provider. Follow up with your doctor or cardiologist as directed:  Write down your questions so you remember to ask them during your visits. © Copyright [employer] 2022 Information is for End User's use only and may not be sold, redistributed or otherwise used for commercial purposes. The above information is an  only. It is not intended as medical advice for individual conditions or treatments. Talk to your doctor, nurse or pharmacist before following any medical regimen to see if it is safe and effective for you.

## 2023-11-30 ENCOUNTER — HOSPITAL ENCOUNTER (EMERGENCY)
Facility: HOSPITAL | Age: 58
Discharge: HOME | End: 2023-11-30
Attending: EMERGENCY MEDICINE
Payer: COMMERCIAL

## 2023-11-30 VITALS
HEART RATE: 61 BPM | DIASTOLIC BLOOD PRESSURE: 83 MMHG | SYSTOLIC BLOOD PRESSURE: 143 MMHG | RESPIRATION RATE: 18 BRPM | WEIGHT: 233 LBS | BODY MASS INDEX: 32.62 KG/M2 | TEMPERATURE: 98.8 F | OXYGEN SATURATION: 97 % | HEIGHT: 71 IN

## 2023-11-30 DIAGNOSIS — S61.211A LACERATION OF LEFT INDEX FINGER WITHOUT FOREIGN BODY WITHOUT DAMAGE TO NAIL, INITIAL ENCOUNTER: Primary | ICD-10-CM

## 2023-11-30 PROCEDURE — 12002 RPR S/N/AX/GEN/TRNK2.6-7.5CM: CPT | Performed by: PHYSICIAN ASSISTANT

## 2023-11-30 PROCEDURE — 99282 EMERGENCY DEPT VISIT SF MDM: CPT

## 2023-11-30 PROCEDURE — 90715 TDAP VACCINE 7 YRS/> IM: CPT | Performed by: PHYSICIAN ASSISTANT

## 2023-11-30 PROCEDURE — 90471 IMMUNIZATION ADMIN: CPT

## 2023-11-30 PROCEDURE — 99284 EMERGENCY DEPT VISIT MOD MDM: CPT | Performed by: PHYSICIAN ASSISTANT

## 2023-11-30 RX ORDER — GINSENG 100 MG
1 CAPSULE ORAL ONCE
Status: COMPLETED | OUTPATIENT
Start: 2023-11-30 | End: 2023-11-30

## 2023-11-30 RX ORDER — LIDOCAINE HYDROCHLORIDE 10 MG/ML
10 INJECTION, SOLUTION EPIDURAL; INFILTRATION; INTRACAUDAL; PERINEURAL ONCE
Status: COMPLETED | OUTPATIENT
Start: 2023-11-30 | End: 2023-11-30

## 2023-11-30 RX ADMIN — LIDOCAINE HYDROCHLORIDE 10 ML: 10 INJECTION, SOLUTION EPIDURAL; INFILTRATION; INTRACAUDAL at 13:10

## 2023-11-30 RX ADMIN — BACITRACIN 1 SMALL APPLICATION: 500 OINTMENT TOPICAL at 13:10

## 2023-11-30 RX ADMIN — TETANUS TOXOID, REDUCED DIPHTHERIA TOXOID AND ACELLULAR PERTUSSIS VACCINE, ADSORBED 0.5 ML: 5; 2.5; 8; 8; 2.5 SUSPENSION INTRAMUSCULAR at 13:14

## 2023-11-30 NOTE — ED PROVIDER NOTES
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 Tear and bleeding to lower part of second finger on left hand. Bleeding controlled. Patient is a 55-year-old right-hand-dominant male with no significant past medical history who presents for evaluation of left second finger laceration. Patient was cutting a hose with a knife when he slipped and cut his hand. Patient does not know when his last tetanus was. Patient states that he can move his finger normally. He specifically denies weakness or numbness. Finger Laceration  Associated symptoms: no fever and no rash        None       History reviewed. No pertinent past medical history. Past Surgical History:   Procedure Laterality Date    HERNIA REPAIR      WOUND DEBRIDEMENT Right 1/3/2017    Procedure: DEBRIDEMENT UPPER EXTREMITY [facility] OUT) and Repair Flexor Tendon Elbow, Repair Ulnar Collateral Ligaments;  Surgeon: Valencia Barreto MD;  Location: [facility];  Service:        Family History   Problem Relation Age of Onset    No Known Problems Mother     Prostate cancer Father      I have reviewed and agree with the history as documented. E-Cigarette/Vaping    E-Cigarette Use Never User      E-Cigarette/Vaping Substances    Nicotine No     THC No     CBD No     Flavoring No     Other No     Unknown No      Social History     Tobacco Use    Smoking status: Never    Smokeless tobacco: Never   Vaping Use    Vaping Use: Never used   Substance Use Topics    Alcohol use: Yes     Comment: casual    Drug use: No       Review of Systems   Constitutional: Negative. Negative for chills and fever. HENT:  Negative for ear pain and sore throat. Eyes: Negative. Negative for pain and visual disturbance. Respiratory:  Negative for cough and shortness of breath. Cardiovascular:  Negative for chest pain and palpitations.    Gastrointestinal:  Negative for abdominal pain and vomiting. Endocrine: Negative. Genitourinary:  Negative for dysuria and hematuria. Musculoskeletal:  Negative for arthralgias and back pain. Skin:  Positive for wound. Negative for color change and rash. Allergic/Immunologic: Negative. Neurological:  Negative for seizures and syncope. All other systems reviewed and are negative. Physical Exam  Physical Exam  Vitals and nursing note reviewed. Constitutional:       General: He is not in acute distress. Appearance: Normal appearance. He is well-developed. HENT:      Head: Normocephalic and atraumatic. Nose: Nose normal.   Eyes:      General: No scleral icterus. Conjunctiva/sclera: Conjunctivae normal.   Cardiovascular:      Rate and Rhythm: Normal rate and regular rhythm. Pulses: Normal pulses. Heart sounds: Normal heart sounds. No murmur heard. Pulmonary:      Effort: Pulmonary effort is normal. No respiratory distress. Musculoskeletal:         General: Swelling, tenderness and signs of injury present. Hands:       Cervical back: Normal range of motion and neck supple. Skin:     General: Skin is warm and dry. Capillary Refill: Capillary refill takes less than 2 seconds. Neurological:      General: No focal deficit present. Mental Status: He is alert and oriented to person, place, and time.    Psychiatric:         Mood and Affect: Mood normal.         Vital Signs  ED Triage Vitals [11/30/23 1230]   Temperature Pulse Respirations Blood Pressure SpO2   98.8 °F (37.1 °C) 61 18 143/83 97 %      Temp Source Heart Rate Source Patient Position - Orthostatic VS BP Location FiO2 (%)   Tympanic Monitor Sitting Right arm --      Pain Score       3           Vitals:    11/30/23 1230   BP: 143/83   Pulse: 61   Patient Position - Orthostatic VS: Sitting         Visual Acuity      ED Medications  Medications   bacitracin topical ointment 1 small application (1 small application Topical Given 11/30/23 1310) lidocaine (PF) (XYLOCAINE-MPF) 1 % injection 10 mL (10 mL Infiltration Given 11/30/23 1310)   tetanus-diphtheria-acellular pertussis (BOOSTRIX) IM injection 0.5 mL (0.5 mL Intramuscular Given 11/30/23 1314)       Diagnostic Studies  Results Reviewed       None                   No orders to display              Procedures  Universal Protocol:  Consent: Verbal consent obtained. Risks and benefits: risks, benefits and alternatives were discussed  Consent given by: patient  Time out: Immediately prior to procedure a "time out" was called to verify the correct patient, procedure, equipment, support staff and site/side marked as required.   Patient identity confirmed: verbally with patient  Laceration repair    Date/Time: 11/30/2023 2:22 PM    Performed by: Parish Carcamo PA-C  Authorized by: Parish Carcamo PA-C  Body area: upper extremity  Location details: left index finger  Laceration length: 4 cm  Foreign bodies: no foreign bodies  Vascular damage: no  Anesthesia: local infiltration    Anesthesia:  Local Anesthetic: lidocaine 1% without epinephrine    Sedation:  Patient sedated: no      Wound Dehiscence:  Superficial Wound Dehiscence: simple closure      Procedure Details:  Irrigation solution: saline  Irrigation method: syringe  Amount of cleaning: standard  Debridement: none  Skin closure: 4-0 nylon  Number of sutures: 7  Technique: simple  Approximation: close  Approximation difficulty: simple  Dressing: 4x4 sterile gauze, gauze roll and antibiotic ointment  Patient tolerance: patient tolerated the procedure well with no immediate complications               ED Course                                             Medical Decision Making  Problems Addressed:  Laceration of left index finger without foreign body without damage to nail, initial encounter: acute illness or injury     Details: Laceration of left index finger without foreign body  Laceration repaired with 4-0 nylon  Hand wrapped with gauze  Patient discharged home  Patient educated on red flag symptoms that would necessitate return to the ED  Patient should present to PCP, urgent care or ED for suture removal in 7 to 10 days    Risk  OTC drugs. Prescription drug management. Disposition  Final diagnoses:   Laceration of left index finger without foreign body without damage to nail, initial encounter     Time reflects when diagnosis was documented in both MDM as applicable and the Disposition within this note       Time User Action Codes Description Comment    11/30/2023  2:19 PM Etienne Imus Add [V61.571M] Laceration of left index finger without foreign body without damage to nail, initial encounter           ED Disposition       ED Disposition   Discharge    Condition   Stable    Date/Time   Thu Nov 30, 2023  2:19 PM    Comment   Nadira Badillo discharge to home/self care. Follow-up Information       Follow up With Specialties Details Why Contact Info Additional Information    Valorie Alberts MD Family Medicine Go to  As needed [address] [facility], [address] [facility] Emergency Department Emergency Medicine Go to  As needed [address] [address]  [address] [facility], [address] [place] [address]            There are no discharge medications for this patient. No discharge procedures on file.     PDMP Review       None            ED Provider  Electronically Signed by             Génesis Munoz PA-C  11/30/23 4806

## 2024-04-29 ENCOUNTER — OFFICE VISIT (OUTPATIENT)
Age: 59
End: 2024-04-29

## 2024-04-29 VITALS
TEMPERATURE: 98.6 F | WEIGHT: 247 LBS | RESPIRATION RATE: 20 BRPM | BODY MASS INDEX: 34.58 KG/M2 | SYSTOLIC BLOOD PRESSURE: 137 MMHG | OXYGEN SATURATION: 96 % | HEIGHT: 71 IN | HEART RATE: 64 BPM | DIASTOLIC BLOOD PRESSURE: 90 MMHG

## 2024-04-29 DIAGNOSIS — L24.7 IRRITANT CONTACT DERMATITIS DUE TO PLANTS, EXCEPT FOOD: Primary | ICD-10-CM

## 2024-04-29 PROCEDURE — 99213 OFFICE O/P EST LOW 20 MIN: CPT | Performed by: FAMILY MEDICINE

## 2024-04-29 RX ORDER — MOMETASONE FUROATE 1 MG/G
CREAM TOPICAL DAILY
Qty: 45 G | Refills: 0 | Status: SHIPPED | OUTPATIENT
Start: 2024-04-29 | End: 2024-05-06

## 2024-04-29 NOTE — PATIENT INSTRUCTIONS
Please buy IVY DRY over the counter and follow instructions on bottle.     Use steroid cream - mometasone- twice a day on site for 7 days or longer if rash continues.

## 2024-04-29 NOTE — ASSESSMENT & PLAN NOTE
"    Likely from poison sumac or ivy vs other tress or plants. Patient is a  and works frequently in the yard. States that he also sprayed fertilizer and worked in a clients yard a day before the rash erupted.     Advised patient to keep area clean and dry   Can use \"Ivy Dry\" over the counter  Prescribed mometasone cream to use twice a day for the next week until rash resolves  Will return if rash persists or worsens despite intervention    "

## 2024-04-29 NOTE — PROGRESS NOTES
"[facility] Office visit    Assessment/Plan:     1. Irritant contact dermatitis due to plants, except food  Assessment & Plan:      Likely from poison sumac or ivy vs other tress or plants. Patient is a  and works frequently in the yard. States that he also sprayed fertilizer and worked in a clients yard a day before the rash erupted.     Advised patient to keep area clean and dry   Can use \"Ivy Dry\" over the counter  Prescribed mometasone cream to use twice a day for the next week until rash resolves  Will return if rash persists or worsens despite intervention      Orders:  -     mometasone (ELOCON) 0.1 % cream; Apply topically daily for 7 days         Return if symptoms worsen or fail to improve.     Subjective:   59 y/o M presents with red, raised rash on his left distal arm, associated with pruritus and itching. Rash started 2 days ago. Lesions are red, and raised in texture. Rash has not changed over time. Rash is pruritic. Associated symptoms: arthralgia on left wrist. Patient denies: abdominal pain, congestion, cough, crankiness, decrease in appetite, decrease in energy level, fever, headache, irritability, nausea, sore throat, and vomiting. Patient has not had contacts with similar rash. Patient has not had new exposures (soaps, lotions, laundry detergents, foods, medications, plants, insects or animals). He is a  and last worked in the garden for a client the day before the rash broke out. No hx of this in the past.             Review of Systems   Constitutional:  Negative for chills and fever.   HENT:  Negative for ear pain and sore throat.    Eyes:  Negative for pain and visual disturbance.   Respiratory:  Negative for cough and shortness of breath.    Cardiovascular:  Negative for chest pain and palpitations.   Gastrointestinal:  Negative for abdominal pain and vomiting.   Genitourinary:  Negative for dysuria and hematuria.   Musculoskeletal:  Negative for arthralgias and " "back pain.   Skin:  Positive for rash. Negative for color change.   Neurological:  Negative for seizures and syncope.   All other systems reviewed and are negative.       Objective:     /90 (BP Location: Right arm, Patient Position: Sitting, Cuff Size: Standard)   Pulse 64   Temp 98.6 °F (37 °C) (Tympanic)   Resp 20   Ht 5' 11\" (1.803 m)   Wt 112 kg (247 lb)   SpO2 96%   BMI 34.45 kg/m²      Physical Exam  Constitutional:       Appearance: Normal appearance.   HENT:      Head: Normocephalic and atraumatic.   Eyes:      General:         Right eye: No discharge.         Left eye: No discharge.      Conjunctiva/sclera: Conjunctivae normal.   Musculoskeletal:         General: No swelling. Normal range of motion.      Cervical back: Normal range of motion and neck supple.   Skin:     General: Skin is warm and dry.      Capillary Refill: Capillary refill takes less than 2 seconds.      Findings: Rash (erythematous, rasied lesion on left proximal forearm) present.   Neurological:      Mental Status: He is alert.          ** Please Note: This note has been constructed using a voice recognition system **     Parviz Saleh MD  04/29/24  4:34 PM    "

## 2024-05-14 LAB
ALBUMIN SERPL-MCNC: 4.6 G/DL (ref 3.8–4.9)
ALBUMIN/GLOB SERPL: 1.7 {RATIO} (ref 1.2–2.2)
ALP SERPL-CCNC: 66 IU/L (ref 44–121)
ALT SERPL-CCNC: 33 IU/L (ref 0–44)
AST SERPL-CCNC: 21 IU/L (ref 0–40)
BILIRUB SERPL-MCNC: 0.5 MG/DL (ref 0–1.2)
BUN SERPL-MCNC: 16 MG/DL (ref 6–24)
BUN/CREAT SERPL: 15 (ref 9–20)
CALCIUM SERPL-MCNC: 9.2 MG/DL (ref 8.7–10.2)
CHLORIDE SERPL-SCNC: 103 MMOL/L (ref 96–106)
CHOLEST SERPL-MCNC: 251 MG/DL (ref 100–199)
CO2 SERPL-SCNC: 23 MMOL/L (ref 20–29)
CREAT SERPL-MCNC: 1.08 MG/DL (ref 0.76–1.27)
EGFR: 80 ML/MIN/1.73
EST. AVERAGE GLUCOSE BLD GHB EST-MCNC: 88 MG/DL
GLOBULIN SER-MCNC: 2.7 G/DL (ref 1.5–4.5)
GLUCOSE SERPL-MCNC: 94 MG/DL (ref 70–99)
HBA1C MFR BLD: 4.7 % (ref 4.8–5.6)
HDLC SERPL-MCNC: 36 MG/DL
LDLC SERPL CALC-MCNC: 173 MG/DL (ref 0–99)
LDLC/HDLC SERPL: 4.8 RATIO (ref 0–3.6)
POTASSIUM SERPL-SCNC: 4.9 MMOL/L (ref 3.5–5.2)
PROT SERPL-MCNC: 7.3 G/DL (ref 6–8.5)
SL AMB VLDL CHOLESTEROL CALC: 42 MG/DL (ref 5–40)
SODIUM SERPL-SCNC: 139 MMOL/L (ref 134–144)
TRIGL SERPL-MCNC: 220 MG/DL (ref 0–149)

## 2024-07-16 ENCOUNTER — TELEPHONE (OUTPATIENT)
Age: 59
End: 2024-07-16

## 2024-07-16 NOTE — TELEPHONE ENCOUNTER
----- Message from Sommer Hook MD sent at 7/15/2024  3:31 PM EDT -----  Hi,    Can you please contact patient to schedule office visit for annual physical and to go over lab results. He was last see by me last August and recently completed blood work but I have been unable to reach him.    Thank you for your help,    Sommer

## 2024-07-16 NOTE — TELEPHONE ENCOUNTER
Called patient to schedule appointment and go over labs as requested. No answer. Left a VM and asked him to call us back.  Thanks!

## 2024-07-17 ENCOUNTER — TELEPHONE (OUTPATIENT)
Age: 59
End: 2024-07-17

## 2024-07-17 NOTE — TELEPHONE ENCOUNTER
Patient called us back and I was able to get him scheduled for a Physical and so Dr. Hook can discuss bloodwork.

## 2024-08-01 ENCOUNTER — OFFICE VISIT (OUTPATIENT)
Age: 59
End: 2024-08-01

## 2024-08-01 VITALS
WEIGHT: 226 LBS | DIASTOLIC BLOOD PRESSURE: 75 MMHG | OXYGEN SATURATION: 97 % | HEART RATE: 59 BPM | SYSTOLIC BLOOD PRESSURE: 118 MMHG | BODY MASS INDEX: 31.64 KG/M2 | RESPIRATION RATE: 17 BRPM | HEIGHT: 71 IN

## 2024-08-01 DIAGNOSIS — Z00.00 ANNUAL PHYSICAL EXAM: Primary | ICD-10-CM

## 2024-08-01 DIAGNOSIS — E78.2 MIXED HYPERLIPIDEMIA: ICD-10-CM

## 2024-08-01 DIAGNOSIS — Z12.5 PROSTATE CANCER SCREENING: ICD-10-CM

## 2024-08-01 PROBLEM — L24.7 IRRITANT CONTACT DERMATITIS DUE TO PLANTS, EXCEPT FOOD: Status: RESOLVED | Noted: 2024-04-29 | Resolved: 2024-08-01

## 2024-08-01 PROCEDURE — 99396 PREV VISIT EST AGE 40-64: CPT | Performed by: FAMILY MEDICINE

## 2024-08-01 NOTE — PROGRESS NOTES
Adult Annual Physical  Name: Herrera Zhou      : 1965      MRN: [id number]  Encounter Provider: Sommer Hook MD  Encounter Date: 2024   Encounter department: [facility]    Assessment & Plan   1. Annual physical exam  2. Mixed hyperlipidemia  Assessment & Plan:  Elevated total cholesterol, low HDL and elevated LDL in the 170s  ASCVD risk of 10.3%.  Patient has been making dietary modifications and states that he has decreased his total cholesterol.  Was on statin however due to severe muscle aches, this was stopped.  Discussed options such as continued diet, exercise and weight loss as well as given handout regarding natural methods to decrease cholesterol.  Given ASCVD percentage, we also discussed CT calcium scoring which patient agrees to.  - CT Calcium score.  - repeat lipid panel in 6 months.  Orders:  -     CT coronary calcium score; Future; Expected date: 2024  -     Lipid Panel with Direct LDL reflex; Future; Expected date: 2025  -     Lipid Panel with Direct LDL reflex  3. Prostate cancer screening  -     PSA, Total Screen; Future  Ordered at last office visit, however not completed by laboratory services. Reprinted to be completed by patient.      Immunizations and preventive care screenings were discussed with patient today. Appropriate education was printed on patient's after visit summary.    Discussed risks and benefits of prostate cancer screening. We discussed the controversial history of PSA screening for prostate cancer in the [place] as well as the risk of over detection and over treatment of prostate cancer by way of PSA screening.  The patient understands that PSA blood testing is an imperfect way to screen for prostate cancer and that elevated PSA levels in the blood may also be caused by infection, inflammation, prostatic trauma or manipulation, urological procedures, or by benign prostatic enlargement.    The role of the  digital rectal examination in prostate cancer screening was also discussed and I discussed with him that there is large interobserver variability in the findings of digital rectal examination.    Counseling:  Alcohol/drug use: discussed moderation in alcohol intake, the recommendations for healthy alcohol use, and avoidance of illicit drug use.  Dental Health: discussed importance of regular tooth brushing, flossing, and dental visits.  Injury prevention: discussed safety/seat belts, safety helmets, smoke detectors, carbon dioxide detectors, and smoking near bedding or upholstery.  Sexual health: discussed sexually transmitted diseases, partner selection, use of condoms, avoidance of unintended pregnancy, and contraceptive alternatives.  Exercise: the importance of regular exercise/physical activity was discussed. Recommend exercise 3-5 times per week for at least 30 minutes.     BMI Counseling: Body mass index is 31.52 kg/m². The BMI is above normal. Nutrition recommendations include encouraging healthy choices of fruits and vegetables, reducing intake of saturated and trans fat and reducing intake of cholesterol. Exercise recommendations include moderate physical activity 150 minutes/week and exercising 3-5 times per week. Rationale for BMI follow-up plan is due to patient being overweight or obese.     Depression Screening and Follow-up Plan: Patient was screened for depression during today's encounter. They screened negative with a PHQ-2 score of 0.        History of Present Illness     Adult Annual Physical:  Patient presents for annual physical.     Diet and Physical Activity:  - Diet/Nutrition: well balanced diet and consuming 3-5 servings of fruits/vegetables daily.  - Exercise: 1-2 times a week on average and moderate cardiovascular exercise.    Depression Screening:  - PHQ-2 Score: 0  - PHQ-9 Score: 0    General Health:  - Sleep: sleeps well.  - Hearing: normal hearing bilateral ears.  - Vision: wears  "glasses. reading glasses  - Dental: regular dental visits and brushes teeth twice daily. flosses regularly.    Advanced Care Planning:  - Has an advanced directive?: yes      Screening:  Colorectal: Negative cologuard 1 year ago.  Prostate Cancer: As described above. PSA ordered again.    Review of Systems   Constitutional:  Negative for fatigue.   HENT:  Negative for congestion, rhinorrhea and sore throat.    Eyes:  Negative for visual disturbance.   Respiratory:  Negative for cough, shortness of breath and wheezing.    Cardiovascular:  Negative for chest pain.   Gastrointestinal:  Negative for abdominal distention, abdominal pain, blood in stool, constipation, diarrhea, nausea and vomiting.   Genitourinary:  Negative for difficulty urinating and dysuria.   Musculoskeletal:  Negative for arthralgias.   Skin:  Negative for rash.   Neurological:  Negative for dizziness and headaches.         Objective     /75 (BP Location: Right arm, Patient Position: Sitting)   Pulse 59   Resp 17   Ht 5' 11\" (1.803 m)   Wt 103 kg (226 lb)   SpO2 97%   BMI 31.52 kg/m²     Physical Exam  Vitals and nursing note reviewed.   Constitutional:       General: He is not in acute distress.     Appearance: Normal appearance. He is well-developed. He is obese. He is not ill-appearing, toxic-appearing or diaphoretic.   HENT:      Head: Normocephalic and atraumatic.      Right Ear: Tympanic membrane, ear canal and external ear normal. There is no impacted cerumen.      Left Ear: Tympanic membrane, ear canal and external ear normal. There is no impacted cerumen.      Nose: Nose normal. No congestion or rhinorrhea.      Mouth/Throat:      Mouth: Mucous membranes are moist.      Pharynx: Oropharynx is clear. No oropharyngeal exudate.   Eyes:      General: No scleral icterus.        Right eye: No discharge.         Left eye: No discharge.      Extraocular Movements: Extraocular movements intact.      Conjunctiva/sclera: Conjunctivae " normal.      Pupils: Pupils are equal, round, and reactive to light.   Cardiovascular:      Rate and Rhythm: Normal rate and regular rhythm.      Pulses: Normal pulses.      Heart sounds: Normal heart sounds. No murmur heard.  Pulmonary:      Effort: Pulmonary effort is normal. No respiratory distress.      Breath sounds: Normal breath sounds. No wheezing.   Abdominal:      General: Abdomen is flat. Bowel sounds are normal. There is no distension.      Palpations: Abdomen is soft.      Tenderness: There is no abdominal tenderness.   Musculoskeletal:         General: No swelling.      Cervical back: Normal range of motion and neck supple. No rigidity or tenderness.      Right lower leg: No edema.      Left lower leg: No edema.   Lymphadenopathy:      Cervical: No cervical adenopathy.   Skin:     General: Skin is warm and dry.      Capillary Refill: Capillary refill takes less than 2 seconds.   Neurological:      Mental Status: He is alert.   Psychiatric:         Mood and Affect: Mood normal.         Behavior: Behavior normal.         Thought Content: Thought content normal.         Judgment: Judgment normal.

## 2024-08-01 NOTE — PATIENT INSTRUCTIONS
Healthy lipids which include Cholesterol and it's sub-fractions such as low density lipoprotein (LDL), high density liproprotein (HDL), triglycerides (TG) or blood fats are very important for general health, including heart and blood vessel health.   The main number on a standard lipid profile to improve is the non-HDL cholesterol. Generally, lowering LDL and raising HDL are good for health.     The main target in treating lipids is to lower LDL particle number and prevent the abnormal modification of LDL (I.e. oxidation oxLDL &/or glycosylation glycosylLDL),  Measuring these requires a specialized lipid test.     The most important aspect of improving lipids for most people is to eat a whole foods eating pattern that is mainly plants, with about a pound (at least 5 servings) of vegetable of different colors and 1 - 2 fruit (focus on berries) to obtain different nutrients.  Most patients will benefit from a handful of nuts a day. Ensure other aspects of healthy lifestyle, such as regular exercise, adequate sleep and good stress management.     Avoid long chain saturated fats, such as found in whole dairy products like cheese, red meat, skin of poultry, coconut milk.     Added excess sugars and refined carbohydrates (such as any product made of white flour), white rice, processed potato products like fries and chips are also under-appreciated major  of abnormal lipids, cardiovascular disease and other chronic diseases.    The following natural supplements can also help improve lipids. Your doctor will guide you on the best choices for you:      Phyto-sterols &  -stanols and their esters can help lower serum cholesterol by about 10% by blocking its absorption in the gut. They can be used for additive effect with Statins or Red Yeast Rice, lowering LDL cholesterol an additional 7 - 10%. However, they have not been directly studied on whether they lower cardiovascular disease. Incidentally, the major plant sterol  in these products is beta-sitosterol, which also may relieve urinary obstructive symptoms in benign prostatic hypertrophy (enlarged prostate).  (MARIANA A/H2)    Typical doses for sterols is 400 mg with a meal or 1000 mg (1 gram) of stanols  twice daily with meals.  Cautions: Sterols/Stanols appear to be very safe, with typical daily doses of 2 - 3 grams generally well tolerated, and studies up to 8 grams a day not showing adverse effects over 3 months. Sterols/stanols may interfere with absorption of fat soluble vitamins (A, D, E, K) and caratenoids, so be sure to eat lots of veggies and fruit containing these vitamins. Occasional GI upset. There are very rare reports of liver irritation.     Recommended brands (typical serving size = total amount of combination of sterols & stanols in mg)*:   Nature made CholestOff Plus is a top brand: (2 softgels = 900 mg). Dose: take 2 softgels twice a day with two largest meals of the day.   [employer] CholestaCare (2 caps = 800 mg)  Dose: take 2 caps twice a day with two largest meals of the day.   Bettyvision Foresterol (1 softgel = 600 mg)  Dose: take 1 softgel three times a day with meals      Benecol is a butter substitute spread which contains 850 mg pf phytostanols per serving, and also may be helpful for those lowering cholesterol. A similar brand is Logicol Original.       *The above 3 brands meet the minimum requirement of at least 1.3 grams plant sterol esters (equivalent to 800 mg free sterols) to be taken in divided doses with meals, equivalent to  400 mg of free sterols twice a day with meals. Elizabeth forms are preferred. The FDA is considering increasing the minimum from 1.3 to 2 G per day, or a free sterol equivalent of 500 mg per serving taken in two meals for 1 gram a day.         Red Yeast Rice (RYR) has been used as a medicinal food for a thousand of years in [place], and is likely effective in lowering lipids (cholesterol) if taken properly. It may be  better tolerated than prescription Statins, causing less problems with muscle aches. RYR can also improve leptin and insulin resistance and lower hsCRP, an index of inflammation and improve nitric oxide for better blood vessel function.    Good forms of Red Yeast Rice include the following three brands, (with the amount of lovastatins & other monacolins  each contains per serving size listed). None of these recommended brands, as per [employer], exceed the contamination limits for citrinin:     1. HPF Cholestene (25 mg lovastatin & 9 mg other monacolins in serving size of 4 capsules daily). Dose: two 600 mg capsules twice daily. Made by [employer]. This is the highest dose of any Red Yeast Rice product - may start at lower dose and titrate up.     2. Nature's Plus Herbal Actives Red Yeast Rice  (8.4 mg lovastatins & 1.6 mg other monacolins in serving size of one 600 mg tablet daily): Dose: One 600 mg tab daily. Could increase dose if needed. Manufactured by [employer]     3. [employer] Choleast (9 mg lovastatins & 3 mg other monacolins in serving size of four 600 mg capsules daily). Dose: Four 600 mg caps daily - twice a day. Manufactured by [employer].     Usual dose of RYR is 600 mg daily to start, then most people will require to titrate up to 1200 mg or even higher by mouth daily. Good idea to to check liver tests as well as lipids a few weeks after start it.  Other not recommended brands vary tremendously in their lipid lowering ability, and may contain excess citrinin, a toxin that can harm the kidneys and liver, so use only recommended brands listed above.   Cautions: Avoid grapefruit juice with RYR. As with any medication, do not take if you are pregnant, breast-feeding, might be allergic or might have a significant interaction with another medicine/supplement unless it is specifically discussed with and approved by your physician.    Green tea (e.g.12 to 60  oz or 2 - 5 cups a day) contains catechins which modestly improve lipids and their function. (MARIANA A/H1)    Pomegranate juice & seeds such as 8 oz. Juice and 1/4 - 1/2 cup seeds improve TG/HDL ratio and are potent antioxidants that lower oxLDL and glycosylLDL.  (MARIANA B/H1)    Monounsaturated fatty acids (MUFA) about 4 TBS per day, such as extra-virgin olive oil and nuts help improve lipids and blood vessel function.  (MARIANA B/H1)    Omega 3 fatty acids, ideally in natural form such as eating high quality cold water fish about twice a week may add modest benefit to cholesterol profiles to the above supplements also. In those who don't eat a whole foods form, supplements such as fish oil caps. Fish oil is especially useful in lowering triglycerides. It works synergistically with plant sterols to improve lipids and reduce inflammation. See separate document on omega 3 f.a.supplements for more details.  (MARIANA A/H2)    Sesame seeds and oil (about 50 grams/day) modestly lower total cholesterol, LDL and triglycerides.  (MARIANA B/H1)    Soy traditional products like tofu (about 30 - 50 grams a day) can moderately lower total cholesterol, LDL and triglycerides and raise HDL.    Curcurmin in bioavailable-enhanced forms may add further modest benefit to the above supplements in lowering LDL cholesterol, as well as reduce general inflammation that underlies many chronic diseases. See separate document on curcumin supplements for more details.  (MARIANA B/H2)    Garlic can also modestly lower total cholesterol by about 5%, and can lower triglycerides by about 10%. It has little or no effect on LDL or HDL, but may modestly slow atherosclerosis and reduce blood pressure. It can reduce harmful oxidation of LDL.   Amounts of garlic generally used in cooking are considered generally safe.  Good brands of garlic supplements include Life Extension Optimized Garlic and Kyolic Aged garlic extract Extra Strength [place] or Kelley Best Grlic  Odor-controlled garlic. Note that garlic can 'thin' the blood,so caution if on other blood thinners. See separate document on garlic supplements for more details.  (MARIANA B/H2)    Flaxseed (Freshly ground, 1 - 2 TBS. per day) modestly improve lipid levels but more importantly improve endothelial (inner llining of blood vessel) functioning.     Citrus bergamot 1 gram a day significantly lowers LDL and TG, while increasing HDL, as well as act as an anti-oxidant and improve oxLDL. Usually well tolerated but can occasionally lead to dizziness, muscle cramps or heartburn. (MARIANA B/H2)    Pantethine (the disulfide derivative of pantothenic acid) is metabolized to cystamine-SH which clinical trials show may improve lipids over several months. Dose is 450 mg twice a day. Can be used with RYR, Statins, Niacin or Fibrates.  (MARIANA B/H2)    Resveratrol is derived from grapes and especially when used with N-aceyltcysteine (NAC), improves oxLDL. Dose of trans resveratrol 250 mg daily with N-acetylcysteine 1 gram daily  (MARIANA B/H2)    Tocotrienols are a family of unsaturated Vitamin E that improve lipids and act as anti-oxidants. They lower high sensitivity C-reactive protein (hsCRP), an index of inflammation in the body and improve gycation end products that can worsen many chronic diseases. Dose of gamma-delta tocotrienol is 200 mg nightly with food. Don't take with alpha-tocopherol, since this can interfere with the lipid lowering effects. (MARIANA B/H2)    Berberine is a plant alkaloid can improve lipids and can be added to RYR and phytosterols. It may increase Statin serum levels. Typical dose: 500 - 1000 mg daily. Occasionally may cause GI upset. . (MARIANA B/H2)    Niacin improves lipids including lowering LDL and TG and raising HDL, and antioxidant status. Clinical trials have mixed results. Niacin is most useful for patients with high TG/HDL, high LDL-P and dense LDL type B. Use only under the direct supervision of a doctor. Usual dose  is 500 mg to 4 grams a day, starting low and gradually increasing. Taking a baby aspirin and quercitin prior to neacin can help reduce flushing. Avoid alcohol. Note that inositol hexanicotinate does not improve lipids. Side effects of niacin include elevated blood sugar, uric acid, gout, hepatitis, flushing rash, itching, gastritis, palpitations, all which are less at lower doses. (MARIANA A/H2)    Celestine Jorge MD, Board certified in Integrative Medicine. Last updated 2/2/24

## 2024-08-02 NOTE — ASSESSMENT & PLAN NOTE
Elevated total cholesterol, low HDL and elevated LDL in the 170s  ASCVD risk of 10.3%.  Patient has been making dietary modifications and states that he has decreased his total cholesterol.  Was on statin however due to severe muscle aches, this was stopped.  Discussed options such as continued diet, exercise and weight loss as well as given handout regarding natural methods to decrease cholesterol.  Given ASCVD percentage, we also discussed CT calcium scoring which patient agrees to.  - CT Calcium score.  - repeat lipid panel in 6 months.

## 2024-09-03 ENCOUNTER — HOSPITAL ENCOUNTER (OUTPATIENT)
Dept: CT IMAGING | Facility: CLINIC | Age: 59
Discharge: HOME | End: 2024-09-03
Payer: COMMERCIAL

## 2024-09-03 DIAGNOSIS — E78.2 MIXED HYPERLIPIDEMIA: ICD-10-CM

## 2024-09-03 PROCEDURE — 75571 CT HRT W/O DYE W/CA TEST: CPT

## 2024-09-09 DIAGNOSIS — R93.1 AGATSTON CORONARY ARTERY CALCIUM SCORE GREATER THAN 400: Primary | ICD-10-CM

## 2024-09-09 DIAGNOSIS — E78.2 MIXED HYPERLIPIDEMIA: ICD-10-CM

## 2024-09-09 LAB — PSA SERPL-MCNC: 1.8 NG/ML (ref 0–4)

## 2024-11-01 NOTE — PROGRESS NOTES
Consultation - Cardiology Office  [facility].    Herrera Zhou 59 y.o. male MRN: [id number]  : 1965  Unit/Bed#:  Encounter: [id number]      ASSESSMENT:  ASCVD    Elevated coronary calcium score 2024  Left main coronary artery: 51  Left anterior descending coronary artery and diagonal branches: 485  Left circumflex coronary artery and left marginal branches: 123  Right coronary artery and right marginal branches: 421  Posterior descending coronary artery: 0     TOTAL coronary calcium score: 1080    Mixed hyperlipidemia  2024: , , HDL 36, normal AST and ALT  On no medications at present.  Patient previously was on atorvastatin and had some lower extremity muscle ache  Will try him on low-dose Crestor with coenzyme every 10 and fish oil to see if he can tolerate             RECOMMENDATIONS:  Crestor 5 mg daily  Coenzyme every 10 300 mg daily  Fish oil 2 g twice daily  Echocardiogram  Stress test            Thank you for your consultation.  If you have any question please call me at [phone]      Primary Care Physician Requesting Consult: Sommer Hook MD      Reason for Consult / Principal Problem: Elevated coronary calcium score        HPI :     Herrera Zhou is a 59 y.o. year old male who was referred by primary care doctor for management of elevated coronary calcium score which on routine screening came out to 1080.  Patient also has mixed hyperlipidemia and was previously tried on atorvastatin but had some lower extremity/calf muscle pain and therefore it was discontinued.  He is agreeable to trying a different statin.  He exercises regularly without any chest pain.  In view of his hyperlipidemia and ASCVD/elevated coronary calcium score I would also recommend an echocardiogram and exercise stress test      Review of Systems  REVIEW OF SYSTEMS:      Constitutional: Negative for activity change, appetite change, chills, fatigue, fever and unexpected weight  change.   HENT: Negative for congestion, sore throat and trouble swallowing.    Eyes: Negative for discharge and redness.   Respiratory: Negative for apnea, cough, chest tightness, shortness of breath and wheezing.    Cardiovascular: Negative for chest pain, shortness of breath, palpitations and leg swelling  Gastrointestinal: Negative for abdominal distention, abdominal pain, anal bleeding, blood in stool, constipation, diarrhea, nausea and vomiting.   Endocrine: Negative for polydipsia, polyphagia and polyuria.   Genitourinary: Negative for difficulty urinating, dysuria, flank pain and hematuria.   Musculoskeletal: Negative for arthralgias, myalgias and neck stiffness.   Skin: Negative for pallor and rash.   Allergic/Immunologic: Negative for environmental allergies.   Neurological: Negative for dizziness, syncope, light-headedness, numbness and headaches.   Hematological: Negative for adenopathy. Does not bruise/bleed easily.   Psychiatric/Behavioral: Negative for confusion and hallucinations. The patient is not nervous/anxious.      Historical Information   No past medical history on file.  Past Surgical History:   Procedure Laterality Date    HERNIA REPAIR      WOUND DEBRIDEMENT Right 1/3/2017    Procedure: DEBRIDEMENT UPPER EXTREMITY (WASH OUT) and Repair Flexor Tendon Elbow, Repair Ulnar Collateral Ligaments;  Surgeon: Amor Rojas MD;  Location: [facility];  Service:      Social History     Substance and Sexual Activity   Alcohol Use Yes    Comment: casual     Social History     Substance and Sexual Activity   Drug Use No     Social History     Tobacco Use   Smoking Status Never   Smokeless Tobacco Never     Family History:   Family History   Problem Relation Age of Onset    No Known Problems Mother     Prostate cancer Father        Meds/Allergies     Allergies   Allergen Reactions    Prochlorperazine Other (See Comments)     Unknown (compazine)       Current Outpatient Medications:     mometasone  (ELOCON) 0.1 % cream, Apply topically daily for 7 days, Disp: 45 g, Rfl: 0    Vitals: There were no vitals taken for this visit.    There is no height or weight on file to calculate BMI.  There were no vitals filed for this visit.  BP Readings from Last 3 Encounters:   08/01/24 118/75   04/29/24 137/90   11/30/23 143/83       Physical Exam  PHYSICAL EXAMINATION:  Neurologic:  Alert & oriented x 3, no new focal deficits, Not in any acute distress,  Constitutional:  Well developed, well nourished, non-toxic appearance   Eyes:  Pupil equal and reacting to light, conjunctiva normal, No JVP, No LNP   HENT:  Atraumatic, oropharynx moist, Neck- normal range of motion, no tenderness,  Neck supple   Respiratory:  Bilateral air entry, mostly clear to auscultation  Cardiovascular: S1-S2 regular rhythm  GI:  Soft, nondistended, normal bowel sounds, nontender, no hepatosplenomegaly appreciated.  Musculoskeletal: no tenderness, no deformities.   Skin:  Well hydrated, no rash   Lymphatic:  No lymphadenopathy noted   Extremities:  No edema and distal pulses are present    Diagnostic Studies Review Cardio:      EKG: Normal sinus rhythm, heart rate 77/min, left axis deviation    Cardiac testing:   No results found for this or any previous visit.        Imaging:  Chest X-Ray:   No Chest XR results available for this patient.    CT-scan of the chest:     No CTA results available for this patient.  Lab Review   Lab Results   Component Value Date    WBC 7.8 07/19/2023    HGB 15.4 07/19/2023    HCT 46.4 07/19/2023    MCV 86 07/19/2023    RDW 13.8 07/19/2023     07/19/2023     BMP:  Lab Results   Component Value Date    SODIUM 139 05/13/2024    K 4.9 05/13/2024     05/13/2024    CO2 23 05/13/2024    BUN 16 05/13/2024    CREATININE 1.08 05/13/2024    GLUC 94 05/13/2024    CALCIUM 8.9 01/02/2017    EGFR 80 05/13/2024     LFT:  Lab Results   Component Value Date    AST 21 05/13/2024    ALT 33 05/13/2024    TP 7.3 05/13/2024     "ALB 4.6 05/13/2024      No results found for: \"[id number]\"  No components found for: \"TSH3\"  Lab Results   Component Value Date    HGBA1C 4.7 (L) 05/13/2024     Lipid Profile:   Lab Results   Component Value Date    CHOLESTEROL 251 (H) 05/13/2024    HDL 36 (L) 05/13/2024    LDLCALC 173 (H) 05/13/2024    TRIG 220 (H) 05/13/2024     Lab Results   Component Value Date    CHOLESTEROL 251 (H) 05/13/2024    CHOLESTEROL 229 (H) 07/19/2023     No results found for: \"CKTOTAL\", \"CKMB\", \"CKMBINDEX\", \"TROPONINI\"  No results found for: \"NTBNP\"   No results found for this or any previous visit (from the past 672 hour(s)).        Dr. Brenda Florentino MD, FACC      \"This note has been constructed using a voice recognition system.Therefore there may be syntax, spelling, and/or grammatical errors. Please call if you have any questions. \"  "

## 2024-11-04 ENCOUNTER — CONSULT (OUTPATIENT)
Dept: CARDIOLOGY CLINIC | Facility: CLINIC | Age: 59
End: 2024-11-04
Payer: COMMERCIAL

## 2024-11-04 VITALS
HEIGHT: 71 IN | WEIGHT: 221 LBS | HEART RATE: 77 BPM | SYSTOLIC BLOOD PRESSURE: 130 MMHG | OXYGEN SATURATION: 97 % | DIASTOLIC BLOOD PRESSURE: 90 MMHG | BODY MASS INDEX: 30.94 KG/M2

## 2024-11-04 DIAGNOSIS — R93.1 AGATSTON CORONARY ARTERY CALCIUM SCORE GREATER THAN 400: ICD-10-CM

## 2024-11-04 DIAGNOSIS — E78.2 MIXED HYPERLIPIDEMIA: Primary | ICD-10-CM

## 2024-11-04 PROCEDURE — 99213 OFFICE O/P EST LOW 20 MIN: CPT | Performed by: INTERNAL MEDICINE

## 2024-11-04 PROCEDURE — 93000 ELECTROCARDIOGRAM COMPLETE: CPT | Performed by: INTERNAL MEDICINE

## 2024-11-04 RX ORDER — ROSUVASTATIN CALCIUM 5 MG/1
5 TABLET, COATED ORAL DAILY
Qty: 90 TABLET | Refills: 2 | Status: SHIPPED | OUTPATIENT
Start: 2024-11-04

## 2024-11-04 RX ORDER — UBIDECARENONE/VIT E ACET 100MG-150
300 CAPSULE ORAL ONCE
Qty: 90 CAPSULE | Refills: 4 | Status: SHIPPED | OUTPATIENT
Start: 2024-11-04 | End: 2024-11-04

## 2024-11-04 RX ORDER — CHLORAL HYDRATE 500 MG
2000 CAPSULE ORAL 2 TIMES DAILY
Qty: 180 CAPSULE | Refills: 4 | Status: SHIPPED | OUTPATIENT
Start: 2024-11-04

## 2024-11-19 ENCOUNTER — HOSPITAL ENCOUNTER (OUTPATIENT)
Dept: NON INVASIVE DIAGNOSTICS | Facility: HOSPITAL | Age: 59
Discharge: HOME | End: 2024-11-19
Attending: INTERNAL MEDICINE
Payer: COMMERCIAL

## 2024-11-19 ENCOUNTER — APPOINTMENT (OUTPATIENT)
Dept: NON INVASIVE DIAGNOSTICS | Facility: HOSPITAL | Age: 59
End: 2024-11-19
Attending: INTERNAL MEDICINE
Payer: COMMERCIAL

## 2024-11-19 VITALS
OXYGEN SATURATION: 99 % | SYSTOLIC BLOOD PRESSURE: 146 MMHG | DIASTOLIC BLOOD PRESSURE: 90 MMHG | RESPIRATION RATE: 20 BRPM

## 2024-11-19 DIAGNOSIS — E78.2 MIXED HYPERLIPIDEMIA: ICD-10-CM

## 2024-11-19 DIAGNOSIS — R93.1 AGATSTON CORONARY ARTERY CALCIUM SCORE GREATER THAN 400: ICD-10-CM

## 2024-11-19 LAB
CHEST PAIN STATEMENT: NORMAL
MAX DIASTOLIC BP: 80 MMHG
MAX PREDICTED HEART RATE: 161 BPM
PROTOCOL NAME: NORMAL
REASON FOR TERMINATION: NORMAL
STRESS POST EXERCISE DUR MIN: 9 MIN
STRESS POST EXERCISE DUR SEC: 22 SEC
STRESS POST PEAK HR: 148 BPM
STRESS POST PEAK SYSTOLIC BP: 186 MMHG
TARGET HR FORMULA: NORMAL
TEST INDICATION: NORMAL

## 2024-11-19 PROCEDURE — 93018 CV STRESS TEST I&R ONLY: CPT | Performed by: INTERNAL MEDICINE

## 2024-11-19 PROCEDURE — 93017 CV STRESS TEST TRACING ONLY: CPT

## 2024-11-19 PROCEDURE — 93016 CV STRESS TEST SUPVJ ONLY: CPT | Performed by: INTERNAL MEDICINE

## 2024-11-20 ENCOUNTER — RESULTS FOLLOW-UP (OUTPATIENT)
Dept: CARDIOLOGY CLINIC | Facility: CLINIC | Age: 59
End: 2024-11-20

## 2024-11-20 LAB
MAX HR PERCENT: 91 %
MAX HR: 148 BPM
RATE PRESSURE PRODUCT: NORMAL
SL CV STRESS RECOVERY BP: NORMAL MMHG
SL CV STRESS RECOVERY HR: 83 BPM
SL CV STRESS RECOVERY O2 SAT: 96 %
SL CV STRESS STAGE REACHED: 4
STRESS ANGINA INDEX: 0
STRESS BASELINE BP: NORMAL MMHG
STRESS BASELINE HR: 78 BPM
STRESS O2 SAT REST: 99 %
STRESS PEAK HR: 141 BPM
STRESS POST ESTIMATED WORKLOAD: 11.3 METS
STRESS POST EXERCISE DUR MIN: 9 MIN
STRESS POST EXERCISE DUR SEC: 22 SEC
STRESS POST O2 SAT PEAK: 96 %
STRESS POST PEAK BP: 186 MMHG

## 2024-11-20 NOTE — TELEPHONE ENCOUNTER
----- Message from Brenda Florentino MD sent at 11/20/2024  8:23 AM EST -----  Please inform the patient that the stress test showed NO evidence of any significant blockage in the blood vessels of your heart.

## 2024-11-30 LAB — UBIQUINONE10 SERPL-MCNC: 1.9 UG/ML (ref 0.37–2.2)

## 2025-01-15 ENCOUNTER — TELEPHONE (OUTPATIENT)
Age: 60
End: 2025-01-15

## 2025-02-04 ENCOUNTER — OFFICE VISIT (OUTPATIENT)
Age: 60
End: 2025-02-04

## 2025-02-04 VITALS
WEIGHT: 224.6 LBS | TEMPERATURE: 98 F | RESPIRATION RATE: 19 BRPM | SYSTOLIC BLOOD PRESSURE: 135 MMHG | HEART RATE: 73 BPM | DIASTOLIC BLOOD PRESSURE: 89 MMHG | BODY MASS INDEX: 31.44 KG/M2 | OXYGEN SATURATION: 96 % | HEIGHT: 71 IN

## 2025-02-04 DIAGNOSIS — M25.531 BILATERAL WRIST PAIN: ICD-10-CM

## 2025-02-04 DIAGNOSIS — I10 PRIMARY HYPERTENSION: ICD-10-CM

## 2025-02-04 DIAGNOSIS — M25.562 CHRONIC PAIN OF BOTH KNEES: ICD-10-CM

## 2025-02-04 DIAGNOSIS — M25.561 CHRONIC PAIN OF BOTH KNEES: ICD-10-CM

## 2025-02-04 DIAGNOSIS — E78.2 MIXED HYPERLIPIDEMIA: Primary | ICD-10-CM

## 2025-02-04 DIAGNOSIS — G89.29 CHRONIC PAIN OF BOTH KNEES: ICD-10-CM

## 2025-02-04 DIAGNOSIS — M25.532 BILATERAL WRIST PAIN: ICD-10-CM

## 2025-02-04 PROCEDURE — 99214 OFFICE O/P EST MOD 30 MIN: CPT | Performed by: FAMILY MEDICINE

## 2025-02-04 RX ORDER — DICLOFENAC SODIUM 30 MG/G
1 GEL TOPICAL 2 TIMES DAILY PRN
Qty: 100 G | Refills: 1 | Status: SHIPPED | OUTPATIENT
Start: 2025-02-04

## 2025-02-04 RX ORDER — ATENOLOL 25 MG/1
25 TABLET ORAL DAILY
Qty: 30 TABLET | Refills: 3 | Status: SHIPPED | OUTPATIENT
Start: 2025-02-04

## 2025-02-04 NOTE — ASSESSMENT & PLAN NOTE
Elevated total cholesterol, low HDL and elevated LDL in the 170s  ASCVD risk of 15.9%.  Patient has been making dietary modifications and states that he has decreased his total cholesterol.  Was on statin however due to severe muscle aches, this was stopped.   Had Calcium Score CT with significantly elevated score. Saw cardiology and restarted on moderate intensity Crestor 5 mg daily which also caused muscle aches therefore he stopped again.  Stress test negative as well.  Patient to see cardiology next month. Plan to repeat lipid panel at that time. Message sent to cardiologist to see if other options are available.

## 2025-02-04 NOTE — ASSESSMENT & PLAN NOTE
Elevated above goal of <130/<80 as per AHA/ACC guidelines.  Given elevated ASCVD risk >10%, patient may use BP medications.  In the past, he was given Atenolol which improves his BP and has no side effects of it.  Refilled medication and recommended to use daily as opposed to PRN.   6 week follow-up.      Orders:    atenolol (TENORMIN) 25 mg tablet; Take 1 tablet (25 mg total) by mouth daily

## 2025-02-04 NOTE — ASSESSMENT & PLAN NOTE
Started 1 month ago;  Bilateral knees, anteriorly.  Patient works as . No injury or falls.  Exam shows normal ROM however mild crepitus appreciated with flexion and extension of knees bilaterally.  Possibly osteoarthritis.  Has tried ibuprofen with improvement.  Voltaren gel ordered to avoid oral NSAIDs regularly.  We may consider CSI at next office visit if pain continues and xrays confirm diagnosis.      Orders:    XR knee 3 vw left non injury; Future    XR knee 3 vw right non injury; Future    Diclofenac Sodium (SOLARAZE) 3 % GEL; Apply 1 Application topically 2 (two) times a day as needed (knee and wrist pain)

## 2025-02-04 NOTE — PROGRESS NOTES
Name: Herrera Zhou      : 1965      MRN: [id number]  Encounter Provider: Sommer Hook MD  Encounter Date: 2025   Encounter department: [facility]  :  Assessment & Plan  Mixed hyperlipidemia  Elevated total cholesterol, low HDL and elevated LDL in the 170s  ASCVD risk of 15.9%.  Patient has been making dietary modifications and states that he has decreased his total cholesterol.  Was on statin however due to severe muscle aches, this was stopped.   Had Calcium Score CT with significantly elevated score. Saw cardiology and restarted on moderate intensity Crestor 5 mg daily which also caused muscle aches therefore he stopped again.  Stress test negative as well.  Patient to see cardiology next month. Plan to repeat lipid panel at that time. Message sent to cardiologist to see if other options are available.         Primary hypertension  Elevated above goal of <130/<80 as per AHA/ACC guidelines.  Given elevated ASCVD risk >10%, patient may use BP medications.  In the past, he was given Atenolol which improves his BP and has no side effects of it.  Refilled medication and recommended to use daily as opposed to PRN.   6 week follow-up.      Orders:    atenolol (TENORMIN) 25 mg tablet; Take 1 tablet (25 mg total) by mouth daily    Chronic pain of both knees  Started 1 month ago;  Bilateral knees, anteriorly.  Patient works as . No injury or falls.  Exam shows normal ROM however mild crepitus appreciated with flexion and extension of knees bilaterally.  Possibly osteoarthritis.  Has tried ibuprofen with improvement.  Voltaren gel ordered to avoid oral NSAIDs regularly.  We may consider CSI at next office visit if pain continues and xrays confirm diagnosis.      Orders:    XR knee 3 vw left non injury; Future    XR knee 3 vw right non injury; Future    Diclofenac Sodium (SOLARAZE) 3 % GEL; Apply 1 Application topically 2 (two) times a day as needed  "(knee and wrist pain)    Bilateral wrist pain  1 month hx;  Works as ;  No injuries to the area.  Improves with ibuprofen.  Exam wnl. No signs of CTS.  Start voltaren gel.                History of Present Illness   Patient is a pleasant 59 year old who presents to follow-up of HLD. Notes to have bilateral knee pain for the past month as well as bilateral wrist pain. No neuropathy symptoms as per patient. Works as . No injuries.      Review of Systems   Constitutional:  Negative for fatigue.   HENT:  Negative for congestion, rhinorrhea and sore throat.    Eyes:  Negative for visual disturbance.   Respiratory:  Negative for cough, shortness of breath and wheezing.    Cardiovascular:  Negative for chest pain.   Gastrointestinal:  Negative for abdominal distention, abdominal pain, blood in stool, constipation, diarrhea, nausea and vomiting.   Genitourinary:  Negative for difficulty urinating and dysuria.   Musculoskeletal:  Positive for arthralgias.   Skin:  Negative for rash.   Neurological:  Negative for dizziness and headaches.       Objective   /89 (BP Location: Right arm, Patient Position: Sitting, Cuff Size: Standard)   Pulse 73   Temp 98 °F (36.7 °C) (Oral)   Resp 19   Ht 5' 11\" (1.803 m)   Wt 102 kg (224 lb 9.6 oz)   SpO2 96%   BMI 31.33 kg/m²      Physical Exam  Vitals and nursing note reviewed.   Constitutional:       General: He is not in acute distress.     Appearance: Normal appearance. He is well-developed. He is obese. He is not ill-appearing, toxic-appearing or diaphoretic.   HENT:      Head: Normocephalic and atraumatic.      Right Ear: Tympanic membrane, ear canal and external ear normal. There is no impacted cerumen.      Left Ear: Tympanic membrane, ear canal and external ear normal. There is no impacted cerumen.      Nose: Nose normal. No congestion or rhinorrhea.      Mouth/Throat:      Mouth: Mucous membranes are moist.      Pharynx: " Oropharynx is clear. No oropharyngeal exudate.   Eyes:      General: No scleral icterus.        Right eye: No discharge.         Left eye: No discharge.      Extraocular Movements: Extraocular movements intact.      Conjunctiva/sclera: Conjunctivae normal.      Pupils: Pupils are equal, round, and reactive to light.   Cardiovascular:      Rate and Rhythm: Normal rate and regular rhythm.      Pulses: Normal pulses.      Heart sounds: Normal heart sounds. No murmur heard.  Pulmonary:      Effort: Pulmonary effort is normal. No respiratory distress.      Breath sounds: Normal breath sounds. No wheezing.   Abdominal:      General: Abdomen is flat. Bowel sounds are normal. There is no distension.      Palpations: Abdomen is soft.      Tenderness: There is no abdominal tenderness.   Musculoskeletal:         General: Tenderness present. No swelling.      Cervical back: Normal range of motion and neck supple. No rigidity or tenderness.      Right lower leg: No edema.      Left lower leg: No edema.      Comments: Bilateral knee exam with normal ROM. Crepitus appreciated with flexion and extension. No tenderness to palpation. No erythema or edema.    Wrist exam wnl. Rom appropriate. No signs of Carpal tunnel. Mild crepitus appreciated. NO swelling or erythema.   Lymphadenopathy:      Cervical: No cervical adenopathy.   Skin:     General: Skin is warm and dry.      Capillary Refill: Capillary refill takes less than 2 seconds.   Neurological:      Mental Status: He is alert.   Psychiatric:         Mood and Affect: Mood normal.         Behavior: Behavior normal.         Thought Content: Thought content normal.         Judgment: Judgment normal.

## 2025-02-04 NOTE — ASSESSMENT & PLAN NOTE
1 month hx;  Works as ;  No injuries to the area.  Improves with ibuprofen.  Exam wnl. No signs of CTS.  Start voltaren gel.

## 2025-04-04 ENCOUNTER — APPOINTMENT (EMERGENCY)
Dept: RADIOLOGY | Facility: HOSPITAL | Age: 60
End: 2025-04-04
Payer: COMMERCIAL

## 2025-04-04 ENCOUNTER — HOSPITAL ENCOUNTER (EMERGENCY)
Facility: HOSPITAL | Age: 60
Discharge: HOME | End: 2025-04-04
Attending: EMERGENCY MEDICINE
Payer: COMMERCIAL

## 2025-04-04 VITALS
TEMPERATURE: 100.4 F | RESPIRATION RATE: 20 BRPM | SYSTOLIC BLOOD PRESSURE: 151 MMHG | DIASTOLIC BLOOD PRESSURE: 97 MMHG | OXYGEN SATURATION: 97 % | HEART RATE: 75 BPM

## 2025-04-04 DIAGNOSIS — S63.501A SPRAIN OF RIGHT WRIST, INITIAL ENCOUNTER: Primary | ICD-10-CM

## 2025-04-04 PROCEDURE — 99283 EMERGENCY DEPT VISIT LOW MDM: CPT

## 2025-04-04 PROCEDURE — 73110 X-RAY EXAM OF WRIST: CPT

## 2025-04-04 PROCEDURE — 99284 EMERGENCY DEPT VISIT MOD MDM: CPT | Performed by: EMERGENCY MEDICINE

## 2025-04-04 RX ORDER — NAPROXEN 500 MG/1
500 TABLET ORAL 2 TIMES DAILY WITH MEALS
Qty: 14 TABLET | Refills: 0 | Status: SHIPPED | OUTPATIENT
Start: 2025-04-04 | End: 2025-04-11

## 2025-04-04 RX ORDER — NAPROXEN 500 MG/1
500 TABLET ORAL ONCE
Status: COMPLETED | OUTPATIENT
Start: 2025-04-04 | End: 2025-04-04

## 2025-04-04 RX ADMIN — NAPROXEN 500 MG: 500 TABLET ORAL at 22:02

## 2025-04-05 NOTE — ED PROVIDER NOTES
Time reflects when diagnosis was documented in both MDM as applicable and the Disposition within this note       Time User Action Codes Description Comment    4/4/2025  9:51 PM Regino Young Add [S63.501A] Sprain of right wrist, initial encounter           ED Disposition       ED Disposition   Discharge    Condition   Stable    Date/Time   Fri Apr 4, 2025  9:51 PM    Comment   Herrera Zhou discharge to home/self care.                   Assessment & Plan       Medical Decision Making  Pulse ox 97% on room air indicating adequate oxygenation.  Xray R wrist: No fracture or dislocation as read by me    Risk  Prescription drug management.             Medications   naproxen (NAPROSYN) tablet 500 mg (500 mg Oral Given 4/4/25 2202)       ED Risk Strat Scores                                                History of Present Illness       Chief Complaint   Patient presents with    Hand Injury     wrist injury when tripped going up inside step       History reviewed. No pertinent past medical history.   Past Surgical History:   Procedure Laterality Date    HERNIA REPAIR      WOUND DEBRIDEMENT Right 1/3/2017    Procedure: DEBRIDEMENT UPPER EXTREMITY (WASH OUT) and Repair Flexor Tendon Elbow, Repair Ulnar Collateral Ligaments;  Surgeon: Amor Rojas MD;  Location: [facility];  Service:       Family History   Problem Relation Age of Onset    No Known Problems Mother     Prostate cancer Father       Social History     Tobacco Use    Smoking status: Never    Smokeless tobacco: Never   Vaping Use    Vaping status: Never Used   Substance Use Topics    Alcohol use: Yes     Comment: casual    Drug use: No      E-Cigarette/Vaping    E-Cigarette Use Never User       E-Cigarette/Vaping Substances    Nicotine No     THC No     CBD No     Flavoring No     Other No     Unknown No       I have reviewed and agree with the history as documented.     Patient presents for evaluation of right wrist injury after mechanical fall while  walking up steps.  Denies any other injury or trauma.      History provided by:  Patient   used: No    Hand Injury      Review of Systems   All other systems reviewed and are negative.          Objective       ED Triage Vitals [04/04/25 2031]   Temperature Pulse Blood Pressure Respirations SpO2 Patient Position - Orthostatic VS   100.4 °F (38 °C) 75 151/97 20 97 % Sitting      Temp Source Heart Rate Source BP Location FiO2 (%) Pain Score    Tympanic Monitor Left arm -- 10 - Worst Possible Pain      Vitals      Date and Time Temp Pulse SpO2 Resp BP Pain Score FACES Pain Rating User   04/04/25 2202 -- -- -- -- -- 10 - Worst Possible Pain -- GIOVANNI   04/04/25 2031 100.4 °F (38 °C) 75 97 % 20 151/97 10 - Worst Possible Pain -- LS            Physical Exam  Vitals and nursing note reviewed.   Constitutional:       General: He is not in acute distress.  Cardiovascular:      Rate and Rhythm: Normal rate.   Pulmonary:      Effort: Pulmonary effort is normal. No respiratory distress.   Musculoskeletal:         General: Swelling and tenderness present. Normal range of motion.        Arms:    Neurological:      General: No focal deficit present.      Mental Status: He is alert and oriented to person, place, and time.         Results Reviewed       None            XR wrist 3+ views RIGHT   Final Interpretation by Willam Vargas MD (04/05 1219)      No acute osseous abnormality. Chronic corticated ulnar styloid fracture.         Computerized Assisted Algorithm (CAA) may have been used to analyze all applicable images.            Workstation performed: [id number]             Splint application    Date/Time: 4/4/2025 10:00 PM    Performed by: Regino Young DO  Authorized by: Regino Young DO    Other Assisting Provider: Yes (comment) (RN)    Verbal consent obtained?: Yes    Consent given by:  Patient  Patient identity confirmed:  Verbally with patient and arm band  Pre-procedure details:     Sensation:   Normal  Procedure details:     Laterality:  Right    Location:  Wrist    Wrist:  R wrist    Cast type:  Short arm    Splint composition: static      Splint type:  Volar short arm (Preformed static volar splint)  Post-procedure details:     Pain:  Unchanged    Sensation:  Normal    Patient tolerance of procedure:  Tolerated well, no immediate complications      ED Medication and Procedure Management   Prior to Admission Medications   Prescriptions Last Dose Informant Patient Reported? Taking?   Diclofenac Sodium (SOLARAZE) 3 % GEL   No No   Sig: Apply 1 Application topically 2 (two) times a day as needed (knee and wrist pain)   Omega-3 Fatty Acids (fish oil) 1,000 mg   No No   Sig: Take 2 capsules (2,000 mg total) by mouth 2 (two) times a day   atenolol (TENORMIN) 25 mg tablet   No No   Sig: Take 1 tablet (25 mg total) by mouth daily   mometasone (ELOCON) 0.1 % cream   No No   Sig: Apply topically daily for 7 days   rosuvastatin (CRESTOR) 5 mg tablet   No No   Sig: Take 1 tablet (5 mg total) by mouth daily      Facility-Administered Medications: None     Discharge Medication List as of 4/4/2025  9:52 PM        START taking these medications    Details   naproxen (NAPROSYN) 500 mg tablet Take 1 tablet (500 mg total) by mouth 2 (two) times a day with meals for 7 days, Starting Fri 4/4/2025, Until Fri 4/11/2025, Normal           CONTINUE these medications which have NOT CHANGED    Details   atenolol (TENORMIN) 25 mg tablet Take 1 tablet (25 mg total) by mouth daily, Starting Tue 2/4/2025, Normal      Diclofenac Sodium (SOLARAZE) 3 % GEL Apply 1 Application topically 2 (two) times a day as needed (knee and wrist pain), Starting Tue 2/4/2025, Normal      mometasone (ELOCON) 0.1 % cream Apply topically daily for 7 days, Starting Mon 4/29/2024, Until Mon 5/6/2024, Normal      Omega-3 Fatty Acids (fish oil) 1,000 mg Take 2 capsules (2,000 mg total) by mouth 2 (two) times a day, Starting Mon 11/4/2024, Normal       rosuvastatin (CRESTOR) 5 mg tablet Take 1 tablet (5 mg total) by mouth daily, Starting Mon 11/4/2024, Normal             ED SEPSIS DOCUMENTATION   Time reflects when diagnosis was documented in both MDM as applicable and the Disposition within this note       Time User Action Codes Description Comment    4/4/2025  9:51 PM Regino Young Add [S63.501A] Sprain of right wrist, initial encounter                  Regino Young DO  04/05/25 9867

## 2025-04-07 ENCOUNTER — TELEPHONE (OUTPATIENT)
Age: 60
End: 2025-04-07

## 2025-04-07 NOTE — TELEPHONE ENCOUNTER
Patient was seen in ED, No out reach done upon chart review Patient has follow up with Ortho 4/8/25?    ED:[place]  CC: Hand Injury  DX:Sprain of right wrist   Time: 8:21pm      No further action needed at this time.

## 2025-04-08 ENCOUNTER — APPOINTMENT (OUTPATIENT)
Dept: RADIOLOGY | Facility: CLINIC | Age: 60
End: 2025-04-08
Payer: COMMERCIAL

## 2025-04-08 ENCOUNTER — OFFICE VISIT (OUTPATIENT)
Dept: OBGYN CLINIC | Facility: CLINIC | Age: 60
End: 2025-04-08
Payer: COMMERCIAL

## 2025-04-08 DIAGNOSIS — S52.614A CLOSED NONDISPLACED FRACTURE OF STYLOID PROCESS OF RIGHT ULNA, INITIAL ENCOUNTER: Primary | ICD-10-CM

## 2025-04-08 DIAGNOSIS — S63.501A SPRAIN OF RIGHT WRIST, INITIAL ENCOUNTER: ICD-10-CM

## 2025-04-08 PROCEDURE — 99214 OFFICE O/P EST MOD 30 MIN: CPT | Performed by: STUDENT IN AN ORGANIZED HEALTH CARE EDUCATION/TRAINING PROGRAM

## 2025-04-08 PROCEDURE — 73110 X-RAY EXAM OF WRIST: CPT

## 2025-04-08 NOTE — PROGRESS NOTES
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 Xrays demonstrate old ulnar styloid injury from 2017 which was well corticated at that time. Given old injury and pain in dorsal ulnar wrist worth with prono supination It was discussed that he has aggravated his prior injury with an acute on chronic ulnar styloid fracture  Recommend a period of immobilization in cast given ulnar styoid injury, however patient deferred despite recommendations and requested a removable wrist brace. This was provided today. I counseled patient that with ulnar styloid fractures its important to minimize rotational stress about his forearm and that's best limited with a non-removable cast, however, patient states with a cast he can't work or perform tasks and refused. He was amenable to wrist brace and lifting restrictions. I instructed for him to use at all times and he will follow up in 6 weeks for repeat xray and clinical evaluation.  Orders:    Ambulatory Referral to Orthopedic Surgery    XR wrist 3+ vw right; Future             ASSESSMENT/PLAN:    Herrera Zhou is a 59 y.o. RHD male who presents with right wrist ulnar styloid acute on chronic injury with fracture through prior fibrous malunion ulnar styloid fracture.     Etiology and treatment options discussed, all his questions were addressed.  Discussed with patient that I recommend a period of immobilization in a cast. Patient deferred cast. He was provided a universal wrist brace at the ED, I do recommend he wear this at all times. Can be removed for hygiene. Should not be removed for work.    No heavy lifting, pushing, or pulling with RUE.       Follow Up:  6 weeks     ____________________________________________________________________________________________________________________________________________      CHIEF COMPLAINT:  Right wrist injury     SUBJECTIVE:  Herrera Zhou is a 59 y.o. male who presents for initial evaluation of right wrist. Patient fell 4/4/25 with a FOOSH mechanism of injury to right wrist. He was seen at ED, where x-rays were obtained, demonstrating no acute fracture. Chronic ulnar styloid fracture noted. He was placed in a splint at the ED. He transitioned to a cotton brace as the splint was uncomfortable.   Radiation: Yes to the  forearm  Previous Treatments: NSAIDs, Tylenol, activity modification, and bracing with only partial relief  Associated symptoms: Stiffness/LROM  Handedness: right  Work status:     I have personally reviewed all the relevant PMH, PSH, SH, FH, Medications and allergies      PAST MEDICAL HISTORY:  No past medical history on file.    PAST SURGICAL HISTORY:  Past Surgical History:   Procedure Laterality Date    HERNIA REPAIR      WOUND DEBRIDEMENT Right 1/3/2017    Procedure: DEBRIDEMENT UPPER EXTREMITY (WASH OUT) and Repair Flexor Tendon Elbow, Repair Ulnar Collateral Ligaments;  Surgeon: Amor Rojas MD;  Location: [facility];  Service:        FAMILY HISTORY:  Family History   Problem Relation Age of Onset    No Known Problems Mother     Prostate cancer Father        SOCIAL HISTORY:  Social History     Tobacco Use    Smoking status: Never    Smokeless tobacco: Never   Vaping Use    Vaping status: Never Used   Substance Use Topics    Alcohol use: Yes     Comment: casual    Drug use: No       MEDICATIONS:    Current Outpatient Medications:     atenolol (TENORMIN) 25 mg tablet, Take 1 tablet (25 mg total) by mouth daily, Disp: 30 tablet, Rfl: 3    Diclofenac Sodium (SOLARAZE) 3 % GEL, Apply 1 Application topically 2 (two) times a day as needed  (knee and wrist pain), Disp: 100 g, Rfl: 1    mometasone (ELOCON) 0.1 % cream, Apply topically daily for 7 days, Disp: 45 g, Rfl: 0    naproxen (NAPROSYN) 500 mg tablet, Take 1 tablet (500 mg total) by mouth 2 (two) times a day with meals for 7 days, Disp: 14 tablet, Rfl: 0    Omega-3 Fatty Acids (fish oil) 1,000 mg, Take 2 capsules (2,000 mg total) by mouth 2 (two) times a day, Disp: 180 capsule, Rfl: 4    rosuvastatin (CRESTOR) 5 mg tablet, Take 1 tablet (5 mg total) by mouth daily, Disp: 90 tablet, Rfl: 2    ALLERGIES:  Allergies   Allergen Reactions    Prochlorperazine Other (See Comments)     Unknown (compazine)         REVIEW OF SYSTEMS:  Pertinent items are noted in HPI.  A comprehensive review of systems was negative.    VITALS:  There were no vitals filed for this visit.    LABS:  HgA1c:   Lab Results   Component Value Date    HGBA1C 4.7 (L) 05/13/2024     BMP:   Lab Results   Component Value Date    CALCIUM 8.9 01/02/2017    K 4.9 05/13/2024    CO2 23 05/13/2024     05/13/2024    BUN 16 05/13/2024    CREATININE 1.08 05/13/2024       _____________________________________________________  PHYSICAL EXAMINATION:  General: well developed and well nourished, alert, oriented times 3, and appears comfortable  Psychiatric: Normal  HEENT: Normocephalic, Atraumatic Trachea Midline, No torticollis  Pulmonary: No audible wheezing or respiratory distress   Abdomen/GI: Non tender, non distended   Cardiovascular: Regular Rate and Rhythm. No pitting edema, 2+ radial pulse   Skin: No masses, erythema, lacerations, fluctation, ulcerations  Neurovascular: Sensation Intact to the Median, Ulnar, Radial Nerve, Motor Intact to the Median, Ulnar, Radial Nerve, and Pulses Intact  Musculoskeletal: Normal, except as noted in detailed exam and in HPI.        FOCUSED MUSCULOSKELETAL EXAMINATION:  Right Upper Extremity  Inspection: skin intact, no notable deformity   Palpation: TTP distal radius   Neurologic: 5/5 elbow flexion,  5/5 elbow extension, 5/5 wrist extension, 5/5 wrist flexion, 5/5 finger flexion, 5/5 finger extension, 5/5 FPL, 5/5 EPL, 5/5 APB, 5/5 intrinsics, sensation intact to median, radial, and ulnar nerve distributions  Vascular: Palpable radial pulse, brisk cap refill <2sec, hand warm and well perfused  MSK:   Mild generalized swelling  Skin is warm and dry to touch with no signs of erythema, ecchymosis, infection  MMT: deferred  Full FDS, FDP, extensor mechanisms are intact  Wrist extension limited secondary to pain and injury   (-) AP / LM Drawer  (-) Ulnar / Radial impaction   Forearm compartments are soft and supple    ___________________________________________________  STUDIES REVIEWED:  Xrays of the right wrist were obtained on 4/8/25 were independently reviewed which demonstrates  Chronic ulnar styloid fracture noted with mild change in alignment and post-traumatic changes with cystic changes about ulnar head indicating acute on chronic injury    LABS REVIEWED:    HgA1c:   Lab Results   Component Value Date    HGBA1C 4.7 (L) 05/13/2024     BMP:   Lab Results   Component Value Date    CALCIUM 8.9 01/02/2017    K 4.9 05/13/2024    CO2 23 05/13/2024     05/13/2024    BUN 16 05/13/2024    CREATININE 1.08 05/13/2024         PROCEDURES PERFORMED:  Procedures  No Procedures performed today    _____________________________________________________      Scribe Attestation      I,:  Julia Mayer am acting as a scribe while in the presence of the attending physician.:       I,:  Amor Prather MD personally performed the services described in this documentation    as scribed in my presence.:               I agree with the history, physical examination, assessment and plan of care as documented above.    Amor Prather M.D.  Attending, Orthopaedic Surgery  Hand, Wrist, and Elbow Surgery  [facility]

## 2025-04-08 NOTE — ASSESSMENT & PLAN NOTE
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 Xrays demonstrate old ulnar styloid injury from 2017 which was well corticated at that time. Given old injury and pain in dorsal ulnar wrist worth with prono supination It was discussed that he has aggravated his prior injury with an acute on chronic ulnar styloid fracture  Recommend a period of immobilization in cast given ulnar styoid injury, however patient deferred despite recommendations and requested a removable wrist brace. This was provided today. I counseled patient that with ulnar styloid fractures its important to minimize rotational stress about his forearm and that's best limited with a non-removable cast, however, patient states with a cast he can't work or perform tasks and refused. He was amenable to wrist brace and lifting restrictions. I instructed for him to use at all times and he will follow up in 6 weeks for repeat xray and clinical evaluation.  Orders:    Ambulatory Referral to Orthopedic Surgery    XR wrist 3+ vw right; Future

## 2025-04-17 ENCOUNTER — TELEPHONE (OUTPATIENT)
Dept: OBGYN CLINIC | Facility: CLINIC | Age: 60
End: 2025-04-17

## 2025-05-12 NOTE — PROGRESS NOTES
Progress Note - Cardiology Office  [facility]    Herrera Zhou 59 y.o. male MRN: [id number]  : 1965  Encounter: [id number]      Assessment & Plan  Mixed hyperlipidemia    Primary hypertension       ASSESSMENT:   ASCVD     Elevated coronary calcium score 2024 Total 1080  Left main coronary artery: 51  Left anterior descending coronary artery and diagonal branches: 485  Left circumflex coronary artery and left marginal branches: 123  Right coronary artery and right marginal branches: 421  Posterior descending coronary artery: 0     TOTAL coronary calcium score: 1080     Mixed hyperlipidemia  2024: , , HDL 36, normal AST and ALT  Has been tried on atorvastatin and rosuvastatin but could not tolerate either due to severe myalgia.  Was also tried on statin along with coenzyme Q10  On fish oil        Stress test, 2024:  Negative for ischemia    TTE, 2024: Ordered but not done             RECOMMENDATIONS:  Repeat lipid and hepatic function panel  Leqvio  Echocardiogram, previously ordered  Low-salt and low-cholesterol diet  Regular cardiovascular exercise    Please call [phone] if any questions.    HPI :     Herrera Zhou is a 59 y.o. year old male who came for follow up.  He has severe mixed hyperlipidemia and very high coronary calcium score of 1080.  He has not been able to tolerate to statins including atorvastatin and rosuvastatin along with CoQ10.  As such I am going to start him on Leqvio    REVIEW OF SYSTEMS:  Denies any new or acute cardiac symptoms.  Denies chest pain, dyspnea, palpitations or syncope      Historical Information   No past medical history on file.  Past Surgical History:   Procedure Laterality Date    HERNIA REPAIR      WOUND DEBRIDEMENT Right 1/3/2017    Procedure: DEBRIDEMENT UPPER EXTREMITY (WASH OUT) and Repair Flexor Tendon Elbow, Repair Ulnar Collateral Ligaments;  Surgeon: Amor Rojas MD;  Location: [facility]  "OR;  Service:      Social History     Substance and Sexual Activity   Alcohol Use Yes    Comment: casual     Social History     Substance and Sexual Activity   Drug Use No     Social History     Tobacco Use   Smoking Status Never   Smokeless Tobacco Never     Family History:   Family History   Problem Relation Age of Onset    No Known Problems Mother     Prostate cancer Father        Meds/Allergies     Allergies   Allergen Reactions    Prochlorperazine Other (See Comments)     Unknown (compazine)       Current Outpatient Medications:     atenolol (TENORMIN) 25 mg tablet, Take 1 tablet (25 mg total) by mouth daily, Disp: 30 tablet, Rfl: 3    Diclofenac Sodium (SOLARAZE) 3 % GEL, Apply 1 Application topically 2 (two) times a day as needed (knee and wrist pain), Disp: 100 g, Rfl: 1    Omega-3 Fatty Acids (fish oil) 1,000 mg, Take 2 capsules (2,000 mg total) by mouth 2 (two) times a day, Disp: 180 capsule, Rfl: 4    mometasone (ELOCON) 0.1 % cream, Apply topically daily for 7 days, Disp: 45 g, Rfl: 0    naproxen (NAPROSYN) 500 mg tablet, Take 1 tablet (500 mg total) by mouth 2 (two) times a day with meals for 7 days, Disp: 14 tablet, Rfl: 0    rosuvastatin (CRESTOR) 5 mg tablet, Take 1 tablet (5 mg total) by mouth daily (Patient not taking: Reported on 5/13/2025), Disp: 90 tablet, Rfl: 2    Vitals: Blood pressure 120/72, pulse 73, height 5' 11\" (1.803 m), weight 99.3 kg (219 lb), SpO2 97%.    Body mass index is 30.54 kg/m².  Vitals:    05/13/25 0818   Weight: 99.3 kg (219 lb)     BP Readings from Last 3 Encounters:   05/13/25 120/72   04/04/25 151/97   02/04/25 135/89       Physical Exam:  Physical Exam    Neurologic:  Alert & oriented x 3, no new focal deficits, Not in any acute distress,  Constitutional: Weight loss  Eyes:  Pupil equal and reacting to light, conjunctiva normal,   HENT:  Atraumatic, oropharynx moist, Neck- normal range of motion, no tenderness,  Neck supple, No JVP, No LNP   Respiratory:  Bilateral air " "entry, mostly clear to auscultation  Cardiovascular: S1-S2 regular with a I/VI systolic murmur   GI:  Soft, nondistended, normal bowel sounds, nontender, no hepatosplenomegaly appreciated.  Musculoskeletal:  No tenderness, no deformities.   Skin:  Well hydrated, no rash   Lymphatic:  No lymphadenopathy noted   Extremities:  No edema and distal pulses are present        Diagnostic Studies Review Cardio:      EKG: Normal sinus rhythm, heart rate 73/min    Cardiac testing:   No results found for this or any previous visit.        Imaging:  Chest X-Ray:   No Chest XR results available for this patient.    CT-scan of the chest:     No CTA results available for this patient.  Lab Review   Lab Results   Component Value Date    WBC 7.8 07/19/2023    HGB 15.4 07/19/2023    HCT 46.4 07/19/2023    MCV 86 07/19/2023    RDW 13.8 07/19/2023     07/19/2023     BMP:  Lab Results   Component Value Date    SODIUM 139 05/13/2024    K 4.9 05/13/2024     05/13/2024    CO2 23 05/13/2024    BUN 16 05/13/2024    CREATININE 1.08 05/13/2024    GLUC 94 05/13/2024    CALCIUM 8.9 01/02/2017    EGFR 80 05/13/2024     LFT:  Lab Results   Component Value Date    AST 21 05/13/2024    ALT 33 05/13/2024    TP 7.3 05/13/2024    ALB 4.6 05/13/2024      No components found for: \"TSH3\"  No results found for: \"[id number]\"  Lab Results   Component Value Date    HGBA1C 4.7 (L) 05/13/2024     Lipid Profile:   Lab Results   Component Value Date    CHOLESTEROL 251 (H) 05/13/2024    HDL 36 (L) 05/13/2024    LDLCALC 173 (H) 05/13/2024    TRIG 220 (H) 05/13/2024     Lab Results   Component Value Date    CHOLESTEROL 251 (H) 05/13/2024    CHOLESTEROL 229 (H) 07/19/2023     No results found for: \"CKTOTAL\", \"CKMB\", \"CKMBINDEX\", \"TROPONINI\"  No results found for: \"NTBNP\"   No results found for this or any previous visit (from the past 4 weeks).          Dr. Brenda Florentino MD, FACC      \"This note has been constructed using a voice recognition " "system.Therefore there may be syntax, spelling, and/or grammatical errors. Please call if you have any questions. \"  "

## 2025-05-13 ENCOUNTER — TELEPHONE (OUTPATIENT)
Dept: CARDIOLOGY CLINIC | Facility: CLINIC | Age: 60
End: 2025-05-13

## 2025-05-13 ENCOUNTER — OFFICE VISIT (OUTPATIENT)
Dept: CARDIOLOGY CLINIC | Facility: CLINIC | Age: 60
End: 2025-05-13
Payer: COMMERCIAL

## 2025-05-13 VITALS
SYSTOLIC BLOOD PRESSURE: 120 MMHG | HEIGHT: 71 IN | BODY MASS INDEX: 30.66 KG/M2 | WEIGHT: 219 LBS | HEART RATE: 73 BPM | OXYGEN SATURATION: 97 % | DIASTOLIC BLOOD PRESSURE: 72 MMHG

## 2025-05-13 DIAGNOSIS — E78.2 MIXED HYPERLIPIDEMIA: ICD-10-CM

## 2025-05-13 DIAGNOSIS — I10 PRIMARY HYPERTENSION: Primary | ICD-10-CM

## 2025-05-13 PROCEDURE — 93000 ELECTROCARDIOGRAM COMPLETE: CPT | Performed by: INTERNAL MEDICINE

## 2025-05-13 PROCEDURE — 99214 OFFICE O/P EST MOD 30 MIN: CPT | Performed by: INTERNAL MEDICINE

## 2025-05-13 NOTE — TELEPHONE ENCOUNTER
Kristal from [employer] phoned.  She needs the office to complete a more current Leqvio Start Form for this patient and any patients going forward.  The most current start form can be found and downloaded at their website: [email or web address]    Her call back number is [phone] ext [phone].  The Service Center is open from 8 a.m. to 8 p.m.  Kristal works from 9 a.m. to 6 p.m.

## 2025-05-13 NOTE — TELEPHONE ENCOUNTER
I faxed Leqvio enrollment form to the [facility] along with insurance cards, ov note, most recent lipid panel, and coronary calcium score.     Scanned start form to patient chart.

## 2025-05-16 LAB
ALBUMIN SERPL-MCNC: 4.5 G/DL (ref 3.8–4.9)
ALP SERPL-CCNC: 79 IU/L (ref 44–121)
ALT SERPL-CCNC: 30 IU/L (ref 0–44)
AST SERPL-CCNC: 21 IU/L (ref 0–40)
BILIRUB DIRECT SERPL-MCNC: 0.16 MG/DL (ref 0–0.4)
BILIRUB SERPL-MCNC: 0.5 MG/DL (ref 0–1.2)
CHOLEST SERPL-MCNC: 219 MG/DL (ref 100–199)
HDLC SERPL-MCNC: 35 MG/DL
LDLC SERPL CALC-MCNC: 137 MG/DL (ref 0–99)
LDLC/HDLC SERPL: 3.9 RATIO (ref 0–3.6)
PROT SERPL-MCNC: 7.3 G/DL (ref 6–8.5)
SL AMB VLDL CHOLESTEROL CALC: 47 MG/DL (ref 5–40)
TRIGL SERPL-MCNC: 259 MG/DL (ref 0–149)

## 2025-05-21 NOTE — TELEPHONE ENCOUNTER
Advised pt that his insurance will cover LEQVIO at 100% however he does need a PA. We scheduled a NV for 6/11 @10am

## 2025-05-21 NOTE — TELEPHONE ENCOUNTER
Fac rcd from Leqvio service center. Leqvio is covered at 100%. No deductible or out of pocket max applies.     Pa is required - [phone]    Will scan to chart.

## 2025-05-28 ENCOUNTER — TELEPHONE (OUTPATIENT)
Dept: ADMINISTRATIVE | Facility: HOSPITAL | Age: 60
End: 2025-05-28

## 2025-05-28 NOTE — TELEPHONE ENCOUNTER
I spoke w/ pt. I asked him if he is having any symptoms at this time and he stated he not. I told him the upcoming echocardiogram is cancelled because  insurance co will likely not approve because of length time since it was originally ordered--to be discussed at next Dr visit in November. However I informed pt that if he has any cardiac concerns between now and then, he should call our office.

## 2025-05-28 NOTE — TELEPHONE ENCOUNTER
NOTIFICATION OF DENIAL [id number] Initial Notification     Patient's echo appointment for 5/29/25 has been cancelled.  Should the P2P be successful, please have patient reach out to central scheduling to reschedule the echo.    Clinical team,  Insurance has denied the following for patient, Herrera Zhou.  The payor allows a Medical Provider (MARIO Agee DO) to complete the [id number] (P2P) discussion.    Payor: [facility]   P2P Scheduling Number: [phone], opt 1  Case Number:  [id number]   HCPCS/ CPT Code(s) Denied: 27925  Denial Reasoning: Does not meet Medical Necessity Guidelines -    Imaging can be done when the notes from your doctor show one of these new or worsening signs   and/or symptoms of heart disease.   You have chest pain or discomfort.   You have events that feel like your heart is beating very fast, fluttering or pounding.   One of the other signs and/or symptoms listed in this guideline. The notes sent to us do not   show any of these.  Notes from your doctor must show one of these prior test results that is concerning for heart   disease.   A new problem in which there is a delay or blockage along the pathway that electrical impulses   travel to make your heart beat.   Frequent extra, abnormal heartbeats that begin in one of your heart's two lower chambers.   A heart rhythm problem that causes the lower chambers not to effectively pump out blood.   One of the other results listed in this guideline. The notes sent in do not show this applies to   you    If the denial is upheld please complete the following:  (1) Call your patient  (2) Cancel appointment by contacting Central Scheduling at [phone] / Send cancellation request to OR scheduling   (3) Cancel order in Epic    Thank you for your help with this case.    [facility] Prior Authorization Team

## (undated) DEVICE — SCD SEQUENTIAL COMPRESSION COMFORT SLEEVE MEDIUM KNEE LENGTH: Brand: KENDALL SCD

## (undated) DEVICE — BASIC DOUBLE BASIN 2-LF: Brand: MEDLINE INDUSTRIES, INC.

## (undated) DEVICE — GLOVE INDICATOR UNDERGLOVE SZ 7.5 GREEN

## (undated) DEVICE — SUT ETHILON 4-0 PS-2 18 IN 1667G

## (undated) DEVICE — SUT ETHIBOND 2 V-37 30 IN MX69G

## (undated) DEVICE — BANDAGE, ESMARK LF STR 6"X9' (20/CS): Brand: CYPRESS

## (undated) DEVICE — Device

## (undated) DEVICE — OCCLUSIVE GAUZE STRIP,3% BISMUTH TRIBROMOPHENATE IN PETROLATUM BLEND: Brand: XEROFORM

## (undated) DEVICE — SPONGE STICK WITH PVP-I: Brand: KENDALL

## (undated) DEVICE — ACE WRAP 4 IN STERILE

## (undated) DEVICE — CYSTO TUBING TUR Y IRRIGATION

## (undated) DEVICE — GLOVE SRG BIOGEL 7.5

## (undated) DEVICE — DRAPE SHEET THREE QUARTER

## (undated) DEVICE — HALF SHEET: Brand: CONVERTORS

## (undated) DEVICE — TIBURON SPLIT SHEET: Brand: CONVERTORS

## (undated) DEVICE — ANTIBACTERIAL UNDYED BRAIDED (POLYGLACTIN 910), SYNTHETIC ABSORBABLE SUTURE: Brand: COATED VICRYL

## (undated) DEVICE — 3000CC GUARDIAN II: Brand: GUARDIAN

## (undated) DEVICE — COBAN 4 IN STERILE

## (undated) DEVICE — CAST PLASTER 4 IN ROLL

## (undated) DEVICE — CAST PADDING 4 IN UNSTERILE

## (undated) DEVICE — NEEDLE 25G X 1 1/2

## (undated) DEVICE — DENTAL PACK: Brand: CARDINAL HEALTH

## (undated) DEVICE — SPONGE LAP 18 X 18 IN

## (undated) DEVICE — SYRINGE 10ML LL CONTROL TOP

## (undated) DEVICE — CUFF TOURNIQUET 24 X 4 IN QUICK CONNECT DISP 1BLA

## (undated) DEVICE — GAUZE SPONGES,16 PLY: Brand: CURITY

## (undated) DEVICE — ROCKER SWITCH PENCIL HOLSTER: Brand: VALLEYLAB

## (undated) DEVICE — FABRIC REINFORCED, SURGICAL GOWN, XL: Brand: CONVERTORS